# Patient Record
Sex: MALE | Race: WHITE | NOT HISPANIC OR LATINO | Employment: FULL TIME | URBAN - METROPOLITAN AREA
[De-identification: names, ages, dates, MRNs, and addresses within clinical notes are randomized per-mention and may not be internally consistent; named-entity substitution may affect disease eponyms.]

---

## 2017-01-04 ENCOUNTER — TELEPHONE (OUTPATIENT)
Dept: OTHER | Facility: OUTPATIENT CENTER | Age: 22
End: 2017-01-04

## 2017-01-04 DIAGNOSIS — F64.0 GENDER DYSPHORIA IN ADOLESCENT AND ADULT: Primary | ICD-10-CM

## 2017-01-04 NOTE — TELEPHONE ENCOUNTER
Requested Medication: Testosterone   Dose: 200mg/mL injection, Inject 0.3mLs into the muscle once a week  Quantity: 2mL  Refills: 3    Last seen at [facility]: 8/15/16  Next Appointment with Provider: 1/23/17

## 2017-01-05 ENCOUNTER — TELEPHONE (OUTPATIENT)
Dept: OTHER | Facility: OUTPATIENT CENTER | Age: 22
End: 2017-01-05

## 2017-01-05 RX ORDER — TESTOSTERONE CYPIONATE 200 MG/ML
60 INJECTION, SOLUTION INTRAMUSCULAR WEEKLY
Qty: 2 ML | Refills: 3 | Status: SHIPPED | OUTPATIENT
Start: 2017-01-05 | End: 2017-06-27

## 2017-01-20 ENCOUNTER — OFFICE VISIT (OUTPATIENT)
Dept: PEDIATRICS | Facility: CLINIC | Age: 22
End: 2017-01-20
Payer: COMMERCIAL

## 2017-01-20 VITALS
BODY MASS INDEX: 23.99 KG/M2 | DIASTOLIC BLOOD PRESSURE: 59 MMHG | WEIGHT: 162 LBS | TEMPERATURE: 98.2 F | HEART RATE: 89 BPM | OXYGEN SATURATION: 98 % | SYSTOLIC BLOOD PRESSURE: 114 MMHG | HEIGHT: 69 IN

## 2017-01-20 DIAGNOSIS — F43.21 ADJUSTMENT DISORDER WITH DEPRESSED MOOD: Primary | ICD-10-CM

## 2017-01-20 DIAGNOSIS — B34.9 VIRAL SYNDROME: ICD-10-CM

## 2017-01-20 DIAGNOSIS — R50.9 FEVER IN PEDIATRIC PATIENT: ICD-10-CM

## 2017-01-20 DIAGNOSIS — R07.0 THROAT PAIN: ICD-10-CM

## 2017-01-20 LAB
DEPRECATED S PYO AG THROAT QL EIA: NORMAL
MICRO REPORT STATUS: NORMAL
SPECIMEN SOURCE: NORMAL

## 2017-01-20 PROCEDURE — 99214 OFFICE O/P EST MOD 30 MIN: CPT | Performed by: PEDIATRICS

## 2017-01-20 PROCEDURE — 87880 STREP A ASSAY W/OPTIC: CPT | Performed by: PEDIATRICS

## 2017-01-20 PROCEDURE — 87081 CULTURE SCREEN ONLY: CPT | Performed by: PEDIATRICS

## 2017-01-20 RX ORDER — SERTRALINE HYDROCHLORIDE 100 MG/1
100 TABLET, FILM COATED ORAL DAILY
Qty: 30 TABLET | Refills: 5 | Status: SHIPPED | OUTPATIENT
Start: 2017-01-20 | End: 2017-08-07

## 2017-01-20 NOTE — NURSING NOTE
"Chief Complaint   Patient presents with     Fever     low grade fever, sore throat. HA 3-4 days.      Abdominal Pain     feel nauses. no diarhea and don't want to do anything 3 days        Initial /59 mmHg  Pulse 89  Temp(Src) 98.2  F (36.8  C) (Oral)  Ht 5' 9.1\" (1.755 m)  Wt 162 lb (73.483 kg)  BMI 23.86 kg/m2  SpO2 98% Estimated body mass index is 23.86 kg/(m^2) as calculated from the following:    Height as of this encounter: 5' 9.1\" (1.755 m).    Weight as of this encounter: 162 lb (73.483 kg).  BP completed using cuff size: SUDEEP Lombardi      "

## 2017-01-20 NOTE — PROGRESS NOTES
"SUBJECTIVE:                                                    Rohan Wilson is a 21 year old male who presents to clinic today because of:    Chief Complaint   Patient presents with     Fever     low grade fever, sore throat. HA 3-4 days.      Abdominal Pain     feel nauses. no diarhea and don't want to do anything 3 days       Rohan is due for a follow up of his depression.   See last visit. At that time we discussed the prolonged period of time in which he has not had any signs/symptoms of depression. Recall Rohan suffered a significant concussion as well as struggling with gender dysphoria at the time his antidepressants were started. He has recently completed transition and is successful in college and has acceptance of his family and friends.   We had discussed weaning off the zoloft. He decided to wait until later, perhaps the summer to give this a try. He has continued on his long time dose of 100 mg once a day without any significant side effects.   PHQ-9 SCORE 5/2/2016 7/8/2016 11/3/2016   Total Score - - -   Total Score 0 1 1       Of note he will soon be 22 and leaving my practice as well.        ROS:  Negative for constitutional, eye, ear, nose, throat, skin, respiratory, cardiac, and gastrointestinal other than those outlined in the HPI.    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 07/18/2016     Priority: Medium     Major depressive disorder, recurrent episode, in partial remission (H) 12/07/2015     Priority: Medium     Stria 05/06/2013      MEDICATIONS:  Current Outpatient Prescriptions   Medication Sig Dispense Refill     testosterone cypionate (DEPOTESTOTERONE CYPIONATE) 200 MG/ML injection Inject 0.3 mLs (60 mg) into the muscle once a week 2 mL 3     sertraline (ZOLOFT) 100 MG tablet Take 1 tablet (100 mg) by mouth daily 30 tablet 0     Syringe/Needle, Disp, 23G X 1\" 1 ML MISC To use with weekly IM injection 50 each 3     " "acetaminophen (TYLENOL) 325 MG tablet Take 2 tablets (650 mg) by mouth every 4 hours as needed for other (mild pain) 100 tablet 0     [DISCONTINUED] sertraline (ZOLOFT) 50 MG tablet Decrease dose by 25 mg every 2 weeks until on 25 mg a day then take every other day for 10 doses 60 tablet 0      ALLERGIES:  Allergies   Allergen Reactions     Amitriptyline-Perphenazine [Perphenazine-Amitriptyline] GI Disturbance     Pt developed an ulcer after taking med       Problem list and histories reviewed & adjusted, as indicated.    OBJECTIVE:                                                      /59 mmHg  Pulse 89  Temp(Src) 98.2  F (36.8  C) (Oral)  Ht 5' 9.1\" (1.755 m)  Wt 162 lb (73.483 kg)  BMI 23.86 kg/m2  SpO2 98%   Normalized stature-for-age data available only for age 0 to 20 years.    GENERAL: Well nourished, well developed tired appearing  SKIN: Clear. No significant rash, abnormal pigmentation or lesions  EYES:  No discharge or erythema. Normal pupils and EOM.  EARS: Normal canals. Tympanic membranes are normal; gray and translucent.  NOSE: boggy erythematous nasal mucosa with clear discharge.  MOUTH/THROAT: mild erythema posterior pharynxno tonsillar hypertrophy  NECK: Supple, no masses.  LYMPH NODES: No adenopathy  LUNGS: Clear. No rales, rhonchi, wheezing or retractions  HEART: Regular rhythm. Normal S1/S2. No murmurs.  ABDOMEN: Soft, non-tender, not distended, no masses or hepatosplenomegaly. Bowel sounds normal.     DIAGNOSTICS: Rapid strep Ag:  negative    ASSESSMENT/PLAN:                                                      1. Adjustment disorder with depressed mood    2. Throat pain    3. Viral syndrome    4. Fever in pediatric patient        FOLLOW UP:   Discussed the differential diagnosis of his/her signs/symptoms.  This is likely a viral illness. Common important and/or treatable causes have been ruled out. Symptomatic treatment with rest fluids, vaporizer, and appropriate doses of " analgesics.   If not improving or if worsening  Return if fever >104 or lasting longer than 5 days.  Discussed Rohan's concerns about his depression and use of medication for this. He is advised to continue eating well, getting exercise and good sleep. Continue supplements  For now continue Zoloft. Reviewed how to wean this and I do recommend this in the next 6 months.   His next visit for depression will be with internal medicine.        Brandi Benitez MD

## 2017-01-20 NOTE — MR AVS SNAPSHOT
"              After Visit Summary   1/20/2017    Rohan Wilson    MRN: [id number]           Patient Information     Date Of Birth          1995        Visit Information        Provider Department      1/20/2017 2:15 PM Brandi Benitez MD [facility]        Today's Diagnoses     Adjustment disorder with depressed mood    -  1    Throat pain        Viral syndrome        Fever in pediatric patient           Follow-ups after your visit        Who to contact     If you have questions or need follow up information about today's clinic visit or your schedule please contact [facility] directly at [phone].  Normal or non-critical lab and imaging results will be communicated to you by Popt[employer], letter or phone within 4 business days after the clinic has received the results. If you do not hear from us within 7 days, please contact the clinic through Poptenth[employer] or phone. If you have a critical or abnormal lab result, we will notify you by phone as soon as possible.  Submit refill requests through [email or web address] or call your pharmacy and they will forward the refill request to us. Please allow 3 business days for your refill to be completed.          Additional Information About Your Visit        MyChart Information     [email or web address] gives you secure access to your electronic health record. If you see a primary care provider, you can also send messages to your care team and make appointments. If you have questions, please call your primary care clinic.  If you do not have a primary care provider, please call [phone] and they will assist you.        Care EveryWhere ID     This is your Care EveryWhere ID. This could be used by other organizations to access your [facility] medical records  [id number]        Your Vitals Were     Pulse Temperature Height Pulse Oximetry BMI (Body Mass Index)       89 98.2  F (36.8  C) (Oral) 5' 9.1\" (1.755 m) 98% 23.85 kg/m2        Blood Pressure from Last " 3 Encounters:   01/23/17 123/59   01/20/17 114/59   11/03/16 107/64    Weight from Last 3 Encounters:   01/23/17 165 lb 9.6 oz (75.1 kg)   01/20/17 162 lb (73.5 kg)   11/03/16 169 lb 6.4 oz (76.8 kg)              We Performed the Following     Beta strep group A culture     Rapid strep screen          Today's Medication Changes          These changes are accurate as of: 1/20/17 11:59 PM.  If you have any questions, ask your nurse or doctor.               These medicines have changed or have updated prescriptions.        Dose/Directions    sertraline 100 MG tablet   Commonly known as:  ZOLOFT   This may have changed:  Another medication with the same name was removed. Continue taking this medication, and follow the directions you see here.   Used for:  Adjustment disorder with depressed mood   Changed by:  Brandi Benitez MD        Dose:  100 mg   Take 1 tablet (100 mg) by mouth daily   Quantity:  30 tablet   Refills:  5            Where to get your medicines      These medications were sent to [facility]/pharmacy #[id number] - [place], [place] - [address], [place] [address]     Phone:  [phone]     sertraline 100 MG tablet                Primary Care Provider Office Phone # Fax #    Brandi Benitez [phone]       [facility] [address] [place] [address]        Thank you!     Thank you for choosing [facility]  for your care. Our goal is always to provide you with excellent care. Hearing back from our patients is one way we can continue to improve our services. Please take a few minutes to complete the written survey that you may receive in the mail after your visit with us. Thank you!             Your Updated Medication List - Protect others around you: Learn how to safely use, store and throw away your medicines at www.disposemymeds.org.          This list is accurate as of: 1/20/17 11:59 PM.  Always use your  most recent med list.                   Brand Name Dispense Instructions for use    acetaminophen 325 MG tablet    TYLENOL    100 tablet    Take 2 tablets (650 mg) by mouth every 4 hours as needed for other (mild pain)       sertraline 100 MG tablet    ZOLOFT    30 tablet    Take 1 tablet (100 mg) by mouth daily       testosterone cypionate 200 MG/ML injection    DEPOTESTOTERONE CYPIONATE    2 mL    Inject 0.3 mLs (60 mg) into the muscle once a week

## 2017-01-23 ENCOUNTER — OFFICE VISIT (OUTPATIENT)
Dept: OTHER | Facility: OUTPATIENT CENTER | Age: 22
End: 2017-01-23

## 2017-01-23 VITALS
SYSTOLIC BLOOD PRESSURE: 123 MMHG | WEIGHT: 165.6 LBS | DIASTOLIC BLOOD PRESSURE: 59 MMHG | BODY MASS INDEX: 24.39 KG/M2 | HEART RATE: 63 BPM

## 2017-01-23 DIAGNOSIS — F64.0 GENDER DYSPHORIA IN ADOLESCENT AND ADULT: Primary | ICD-10-CM

## 2017-01-23 LAB
BACTERIA SPEC CULT: NORMAL
MICRO REPORT STATUS: NORMAL
SPECIMEN SOURCE: NORMAL

## 2017-01-23 ASSESSMENT — ENCOUNTER SYMPTOMS
CHILLS: 0
FEVER: 0
PALPITATIONS: 0
ABDOMINAL PAIN: 0
HEADACHES: 0
DOUBLE VISION: 0
POLYDIPSIA: 0
FOCAL WEAKNESS: 0
SENSORY CHANGE: 0
SHORTNESS OF BREATH: 0
DEPRESSION: 0
NAUSEA: 0
WEIGHT LOSS: 0
DIZZINESS: 0

## 2017-01-23 NOTE — PROGRESS NOTES
HPI  Pt. Here for f/u TG care    He continues on 60 IM Depo Testosterone weekly. No problems with medication or injections. No changes in body since last visit; no break through bleeding. No new health concerns since last visit  Activity: work out 6x/week--cardio and strength.         Review of Systems   Constitutional: Negative for fever, chills and weight loss.   Eyes: Negative for double vision.   Respiratory: Negative for shortness of breath.    Cardiovascular: Negative for chest pain, palpitations and leg swelling.   Gastrointestinal: Negative for nausea and abdominal pain.   Neurological: Negative for dizziness, sensory change, focal weakness and headaches.   Endo/Heme/Allergies: Negative for polydipsia.   Psychiatric/Behavioral: Negative for depression.         Physical Exam   Constitutional: He is well-developed, well-nourished, and in no distress.   Cardiovascular: Normal rate, regular rhythm and normal heart sounds.    Pulmonary/Chest: Effort normal and breath sounds normal.   Blood pressure 123/59, pulse 63, weight 75.116 kg (165 lb 9.6 oz).  Body mass index is 24.39 kg/(m^2).  Voice, skin per flowsheet    A/P  1. Gender dysphoria  Masculinizing well on current regimen. No changes in dose, continue activity.    F/u 4-6 months, will order labs at that visit.

## 2017-01-27 ENCOUNTER — TELEPHONE (OUTPATIENT)
Dept: OTHER | Facility: OUTPATIENT CENTER | Age: 22
End: 2017-01-27

## 2017-02-07 ENCOUNTER — TELEPHONE (OUTPATIENT)
Dept: OTHER | Facility: OUTPATIENT CENTER | Age: 22
End: 2017-02-07

## 2017-02-07 NOTE — TELEPHONE ENCOUNTER
Mother called to speak for Rohan saying that neither he nor she thought that regular appointments were necessary.  Returned call and let her know that we could certainly take Rohan out of the block schedule and if he ever felt the need to return, to just give a call.  Also called Rohan's cell phone to give him the same message.  Let both know that Dr. Gibson might need Rohan to see a mental health provider to check in periodically in order to continue the T and that I would send her a staff message letting her know to about Rohan appropriately discontinuing therapy at this time.

## 2017-06-27 DIAGNOSIS — F64.0 GENDER DYSPHORIA IN ADOLESCENT AND ADULT: ICD-10-CM

## 2017-06-27 RX ORDER — TESTOSTERONE CYPIONATE 200 MG/ML
60 INJECTION, SOLUTION INTRAMUSCULAR WEEKLY
Qty: 2 ML | Refills: 2 | Status: SHIPPED | OUTPATIENT
Start: 2017-06-27 | End: 2017-07-06

## 2017-06-29 ENCOUNTER — TELEPHONE (OUTPATIENT)
Dept: OTHER | Facility: OUTPATIENT CENTER | Age: 22
End: 2017-06-29

## 2017-07-03 ENCOUNTER — TELEPHONE (OUTPATIENT)
Dept: OTHER | Facility: OUTPATIENT CENTER | Age: 22
End: 2017-07-03

## 2017-07-06 ENCOUNTER — OFFICE VISIT (OUTPATIENT)
Dept: OTHER | Facility: OUTPATIENT CENTER | Age: 22
End: 2017-07-06

## 2017-07-06 VITALS
HEART RATE: 71 BPM | BODY MASS INDEX: 22.66 KG/M2 | SYSTOLIC BLOOD PRESSURE: 101 MMHG | DIASTOLIC BLOOD PRESSURE: 59 MMHG | WEIGHT: 153 LBS | HEIGHT: 69 IN

## 2017-07-06 DIAGNOSIS — F64.0 GENDER DYSPHORIA IN ADOLESCENT AND ADULT: ICD-10-CM

## 2017-07-06 DIAGNOSIS — Z78.9 FEMALE-TO-MALE TRANSGENDER PERSON: Primary | ICD-10-CM

## 2017-07-06 PROBLEM — Z90.13 S/P MASTECTOMY, BILATERAL: Status: ACTIVE | Noted: 2017-07-06

## 2017-07-06 RX ORDER — TESTOSTERONE CYPIONATE 200 MG/ML
60 INJECTION, SOLUTION INTRAMUSCULAR WEEKLY
Qty: 2 ML | Refills: 2 | Status: SHIPPED | OUTPATIENT
Start: 2017-07-06 | End: 2017-10-20

## 2017-07-06 ASSESSMENT — ENCOUNTER SYMPTOMS
VOMITING: 0
UNEXPECTED WEIGHT CHANGE: 0
NERVOUS/ANXIOUS: 0
FEVER: 0
ABDOMINAL PAIN: 0
PALPITATIONS: 0
FREQUENCY: 0
POLYDIPSIA: 0
DYSPHORIC MOOD: 0
NUMBNESS: 0
SHORTNESS OF BREATH: 0
HEADACHES: 0
CHEST TIGHTNESS: 0
LIGHT-HEADEDNESS: 0
WEAKNESS: 0
CHILLS: 0

## 2017-07-06 NOTE — MR AVS SNAPSHOT
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      Who to contact     Please call your clinic at [phone] to:    Ask questions about your health    Make or cancel appointments    Discuss your medicines    Learn about your test results    Speak to your doctor   If you have compliments or concerns about an experience at your clinic, or if you wish to file a complaint, please contact [facility] Patient Relations at [phone] or email us at [email or web address]         Additional Information About Your Visit        MyChart Information     [employer] gives you secure access to your electronic health record. If you see a primary care provider, you can also send messages to your care team and make appointments. If you have questions, please call your primary care clinic.  If you do not have a primary care provider, please call [phone] and they will assist you.      [employer] is an electronic gateway that provides easy, online access to your medical records. With [employer], you can request a clinic appointment, read your test results, renew a prescription or communicate with your care team.     To access your existing account, please contact your [facility] or call [phone] for assistance.        Care EveryWhere ID     This is your Care EveryWhere ID. This could be used by other organizations to access your [facility] medical records  [id number]        Your Vitals Were     Pulse Height  "BMI (Body Mass Index)             71 1.753 m (5' 9\") 22.59 kg/m2          Blood Pressure from Last 3 Encounters:   07/06/17 101/59   01/23/17 123/59   01/20/17 114/59    Weight from Last 3 Encounters:   07/06/17 69.4 kg (153 lb)   01/23/17 75.1 kg (165 lb 9.6 oz)   01/20/17 73.5 kg (162 lb)                 Where to get your medicines      These medications were sent to [facility] [place], [place] - [address]  [address] , [facility] [address]     Phone:  [phone]     Syringe/Needle (Disp) 23G X 1\" 1 ML Misc         Some of these will need a paper prescription and others can be bought over the counter.  Ask your nurse if you have questions.     Bring a paper prescription for each of these medications     testosterone cypionate 200 MG/ML injection          Primary Care Provider    None Specified       No primary provider on file.        Equal Access to Services     INGRID HERRERA : Preet guerreroo Sotimothy, waaxda luqadaha, qaybta kaalmada adeenoch, mary bennett . [facility] [phone].    ATENCIÓN: Si habla español, tiene a dodge disposición servicios gratuitos de asistencia lingüística. Llame al [phone].    We comply with applicable federal civil rights laws and [place] laws. We do not discriminate on the basis of race, color, national origin, age, disability sex, sexual orientation or gender identity.            Thank you!     Thank you for choosing [facility]  for your care. Our goal is always to provide you with excellent care. Hearing back from our patients is one way we can continue to improve our services. Please take a few minutes to complete the written survey that you may receive in the mail after your visit with us. Thank you!             Your Updated Medication List - Protect others around you: Learn how to safely use, store and throw away your medicines at www.disposemymeds.org.          This list is accurate as of: " "7/6/17 11:59 PM.  Always use your most recent med list.                   Brand Name Dispense Instructions for use Diagnosis    acetaminophen 325 MG tablet    TYLENOL    100 tablet    Take 2 tablets (650 mg) by mouth every 4 hours as needed for other (mild pain)    Gender identity disorder in adolescents or adults       sertraline 100 MG tablet    ZOLOFT    30 tablet    Take 1 tablet (100 mg) by mouth daily    Adjustment disorder with depressed mood       Syringe/Needle (Disp) 23G X 1\" 1 ML Misc     50 each    To use with weekly IM injection    Gender dysphoria in adolescent and adult, Female-to-male transgender person       testosterone cypionate 200 MG/ML injection    DEPOTESTOTERONE    2 mL    Inject 0.3 mLs (60 mg) into the muscle once a week    Gender dysphoria in adolescent and adult, Female-to-male transgender person         "

## 2017-07-06 NOTE — NURSING NOTE
"Chief Complaint   Patient presents with     RECHECK     TG - Medication renewal       Vitals:    07/06/17 1328   BP: 101/59   Pulse: 71   Weight: 69.4 kg (153 lb)   Height: 1.753 m (5' 9\")       Body mass index is 22.59 kg/(m^2).      Marlen Rodriguez CMA      No Current Primary Provider                        "

## 2017-07-06 NOTE — PROGRESS NOTES
"       MAGDALENE Madrigal is a 22 year old individual that uses pronouns He/Him/His/Himself that presents today for follow up of:  masculinizing hormone therapy.   Gender identity: male.   Started Hormone  therapy  11/2015  Continues on Depo Testosterone 60 mg IM weekly    Any special concerns today?  no current concerns  On hormones?  YES +++ Shot day of the week? Thursday      Due for labs?  Yes      +++ Refills of meds needed?  Yes  Gender related body changes since last visit: None, no breakthrough bleeding    Activity: work out 6x/day cardio and strength training. 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  New health concerns since last visit: none  ---    Past Surgical History:   Procedure Laterality Date     ESOPHAGOSCOPY, GASTROSCOPY, DUODENOSCOPY (EGD), COMBINED  2/9/2014    Procedure: COMBINED ESOPHAGOSCOPY, GASTROSCOPY, DUODENOSCOPY (EGD), BIOPSY SINGLE OR MULTIPLE;  Esophagoscopy,Gastroscopy,Duodenoscopy- room 240 with stomach biopsies via cold forceps.;  Surgeon: Carmelo Tidwell MD;  Location:  GI     TRANSGENDER MASTECTOMY Bilateral 7/25/2016    Procedure: TRANSGENDER MASTECTOMY;  Surgeon: Rosario Resendiz MD;  Location: [facility] OR       Patient Active Problem List   Diagnosis     Stria     Major depressive disorder, recurrent episode, in partial remission (H)     Female-to-male transgender person       Current Outpatient Prescriptions   Medication Sig Dispense Refill     testosterone cypionate (DEPOTESTOTERONE) 200 MG/ML injection Inject 0.3 mLs (60 mg) into the muscle once a week 2 mL 2     Syringe/Needle, Disp, 23G X 1\" 1 ML MISC To use with weekly IM injection 50 each 3     sertraline (ZOLOFT) 100 MG tablet Take 1 tablet (100 mg) by mouth daily 30 tablet 5     acetaminophen (TYLENOL) 325 MG tablet Take 2 tablets (650 mg) by mouth every 4 hours as needed for other (mild pain) 100 tablet 0       History   Smoking Status     Never Smoker   Smokeless Tobacco     Never Used     Comment: no smokers in the " "home          Allergies   Allergen Reactions     Amitriptyline-Perphenazine [Perphenazine-Amitriptyline] GI Disturbance     Pt developed an ulcer after taking med       Health Maintenance Due   Topic Date Due     MIGRAINE ACTION PLAN  02/22/2014     DEPRESSION ACTION PLAN Q1 YR  03/03/2016     Mental Health Tx Plan Q11 MOS  11/01/2016     PHQ-9 Q3 MONTHS  02/03/2017     TETANUS IMMUNIZATION (SYSTEM ASSIGNED)  08/02/2017         Problem, Medication and Allergy Lists were reviewed and are current..         Review of Systems:   Review of Systems   Constitutional: Negative for chills, fever and unexpected weight change.   Eyes: Negative for visual disturbance.   Respiratory: Negative for chest tightness and shortness of breath.    Cardiovascular: Negative for chest pain, palpitations and leg swelling.   Gastrointestinal: Negative for abdominal pain and vomiting.   Endocrine: Negative for polydipsia and polyuria.   Genitourinary: Negative for frequency.   Neurological: Negative for weakness, light-headedness, numbness and headaches.   Psychiatric/Behavioral: Negative for dysphoric mood and suicidal ideas. The patient is not nervous/anxious.             Physical Exam:     Vitals:    07/06/17 1328   BP: 101/59   Pulse: 71   Weight: 69.4 kg (153 lb)   Height: 1.753 m (5' 9\")     BMI= Body mass index is 22.59 kg/(m^2).   Wt Readings from Last 10 Encounters:   07/06/17 69.4 kg (153 lb)   01/23/17 75.1 kg (165 lb 9.6 oz)   01/20/17 73.5 kg (162 lb)   11/03/16 76.8 kg (169 lb 6.4 oz)   08/15/16 72.6 kg (160 lb)   08/02/16 73.8 kg (162 lb 9.6 oz)   07/25/16 73.4 kg (161 lb 13.1 oz)   07/14/16 73.5 kg (162 lb)   07/07/16 73.5 kg (162 lb)   05/02/16 75.8 kg (167 lb)     Appearance: Male appearance and dress    GENERAL:: healthy, alert and no distress  RESP: lungs clear to auscultation - no rales, no rhonchi, no wheezes  CV: regular rates and rhythm, normal S1 S2, no S3 or S4 and no murmur, no click or rub -  Voice, skin/ per " flowsheet    Affect: Appropriate/mood-congruent           Labs:   Results from last visit:  Office Visit on 01/20/2017   Component Date Value Ref Range Status     Specimen Description 01/20/2017 Throat   Final     Rapid Strep A Screen 01/20/2017    Final                    Value:NEGATIVE: No Group A streptococcal antigen detected by immunoassay, await   culture report.       Micro Report Status 01/20/2017 FINAL 01/20/2017   Final     Specimen Description 01/20/2017 Throat   Final     Culture Micro 01/20/2017 No Beta Streptococcus isolated   Final     Micro Report Status 01/20/2017 FINAL 01/23/2017   Final       Assessment and Plan   1. Gender dysphoria  Masculinizing well on current dose testosterone  Check Hgb, lipid panel plus, testosterone level.     Not currently sexually active  Contraception:   not needed  Health Maintenance:  Pap Smear Report(most recent only)       When: reminided about guidelines for Pap testing    HPVQuadrivalent 3/31/2011, 10/19/2009, 8/2/2007       Counselled patient about controlled substances: Yes. Details:       Follow up:  Follow up in 4-6 months  Results by mychart  Questions were elicited and answered.     Stalin Gibson MD

## 2017-07-22 ENCOUNTER — HEALTH MAINTENANCE LETTER (OUTPATIENT)
Age: 22
End: 2017-07-22

## 2017-07-29 DIAGNOSIS — F43.21 ADJUSTMENT DISORDER WITH DEPRESSED MOOD: ICD-10-CM

## 2017-07-31 NOTE — TELEPHONE ENCOUNTER
Zoloft 100mg       Last Written Prescription Date: 1/20/17  Last Fill Quantity: 30,  # refills: 5   Last Office Visit with G, P or [facility] prescribing provider: 1/20/17    Routing refill request to provider for review/approval because:  Pediatric protocol     RENITA White

## 2017-08-01 NOTE — TELEPHONE ENCOUNTER
Writer called to clarify on medication. Patient's mom stated (CTC in epic) that patient is at work and she will have to ask him if he needs this refill or if this was an error. Patient's mother also updated writer that patient will be transitioning to Dr. Sandoval in .  Pending call back from patient for update on medication  Routed to current PCP as a GRETCHEN White RN

## 2017-08-01 NOTE — TELEPHONE ENCOUNTER
Please call;  This may be a refill error. If he needs more medication please authorize fill for one more month. Rohan should be seeing internal medicine now.   The plan had been to stop this medication.

## 2017-08-02 ENCOUNTER — TELEPHONE (OUTPATIENT)
Dept: PEDIATRICS | Facility: CLINIC | Age: 22
End: 2017-08-02

## 2017-08-02 NOTE — TELEPHONE ENCOUNTER
Writer received phone call from pharmacy asking if script was approved for refill by PCP. Writer updated that PCP needed some information from patient prior to approval. Pending a call back from patient, call was placed yesterday 8/1/17, please see below message.

## 2017-08-07 DIAGNOSIS — F43.21 ADJUSTMENT DISORDER WITH DEPRESSED MOOD: ICD-10-CM

## 2017-08-07 RX ORDER — SERTRALINE HYDROCHLORIDE 100 MG/1
TABLET, FILM COATED ORAL
Qty: 30 TABLET | Refills: 0 | OUTPATIENT
Start: 2017-08-07

## 2017-08-07 RX ORDER — SERTRALINE HYDROCHLORIDE 100 MG/1
100 TABLET, FILM COATED ORAL DAILY
Qty: 30 TABLET | Refills: 0 | Status: SHIPPED | OUTPATIENT
Start: 2017-08-07 | End: 2017-09-08

## 2017-08-07 NOTE — TELEPHONE ENCOUNTER
Mother calls back and says that Rohan decided to keep taking Zoloft because he is going back to school soon.  She says he will have a new roommate and just feels he'll do better adjusting to everything if he stays on Zoloft.    She says she will get him in to see Dr Sandoval before he needs another refill.

## 2017-08-12 ENCOUNTER — HEALTH MAINTENANCE LETTER (OUTPATIENT)
Age: 22
End: 2017-08-12

## 2017-09-07 DIAGNOSIS — F43.21 ADJUSTMENT DISORDER WITH DEPRESSED MOOD: ICD-10-CM

## 2017-09-07 RX ORDER — SERTRALINE HYDROCHLORIDE 100 MG/1
100 TABLET, FILM COATED ORAL DAILY
Qty: 30 TABLET | Refills: 0 | Status: CANCELLED | OUTPATIENT
Start: 2017-09-07

## 2017-09-07 NOTE — TELEPHONE ENCOUNTER
This is likely a pharmacy generated refill request. Rohan has an appointment with Dr. Sandoval tomorrow for follow up antidepressant.   Please make sure he knows he has this appointment.

## 2017-09-07 NOTE — TELEPHONE ENCOUNTER
Sertraline      Last Written Prescription Date:  8/7/17  Last Fill Quantity: 30,   # refills: 0  Last Office Visit with G, P or [facility] prescribing provider: 1/20/17  Future Office visit:    Next 5 appointments (look out 90 days)     Sep 08, 2017  2:40 PM CDT   SHORT with Rubia Sandoval MD   [facility]    [address]  [facility] [address]   [phone]                   Routing refill request to provider for review/approval because:  Pediatric protocol  Francine Hawley RN

## 2017-09-08 ENCOUNTER — OFFICE VISIT (OUTPATIENT)
Dept: INTERNAL MEDICINE | Facility: CLINIC | Age: 22
End: 2017-09-08
Payer: COMMERCIAL

## 2017-09-08 VITALS
OXYGEN SATURATION: 98 % | HEART RATE: 92 BPM | TEMPERATURE: 98.2 F | WEIGHT: 153 LBS | HEIGHT: 69 IN | DIASTOLIC BLOOD PRESSURE: 60 MMHG | SYSTOLIC BLOOD PRESSURE: 102 MMHG | BODY MASS INDEX: 22.66 KG/M2

## 2017-09-08 DIAGNOSIS — F32.5 MAJOR DEPRESSIVE DISORDER WITH SINGLE EPISODE, IN FULL REMISSION (H): Primary | ICD-10-CM

## 2017-09-08 PROCEDURE — 99213 OFFICE O/P EST LOW 20 MIN: CPT | Performed by: INTERNAL MEDICINE

## 2017-09-08 RX ORDER — SERTRALINE HYDROCHLORIDE 100 MG/1
100 TABLET, FILM COATED ORAL DAILY
Qty: 90 TABLET | Refills: 3 | Status: SHIPPED | OUTPATIENT
Start: 2017-09-08 | End: 2018-08-27

## 2017-09-08 NOTE — MR AVS SNAPSHOT
"              After Visit Summary   9/8/2017    Rohan Wilson    MRN: [id number]           Patient Information     Date Of Birth          1995        Visit Information        Provider Department      9/8/2017 2:40 PM Rubia Sandoval MD [facility]        Today's Diagnoses     Major depressive disorder with single episode, in full remission (H)    -  1       Follow-ups after your visit        Who to contact     If you have questions or need follow up information about today's clinic visit or your schedule please contact [facility] directly at [phone].  Normal or non-critical lab and imaging results will be communicated to you by Meridian-IQ[employer], letter or phone within 4 business days after the clinic has received the results. If you do not hear from us within 7 days, please contact the clinic through [employer] or phone. If you have a critical or abnormal lab result, we will notify you by phone as soon as possible.  Submit refill requests through [email or web address] or call your pharmacy and they will forward the refill request to us. Please allow 3 business days for your refill to be completed.          Additional Information About Your Visit        MyChart Information     Spring.me gives you secure access to your electronic health record. If you see a primary care provider, you can also send messages to your care team and make appointments. If you have questions, please call your primary care clinic.  If you do not have a primary care provider, please call [phone] and they will assist you.        Care EveryWhere ID     This is your Care EveryWhere ID. This could be used by other organizations to access your [place] medical records  [id number]        Your Vitals Were     Pulse Temperature Height Pulse Oximetry BMI (Body Mass Index)       92 98.2  F (36.8  C) (Oral) 5' 9\" (1.753 m) 98% 22.59 kg/m2        Blood Pressure from Last 3 Encounters:   09/08/17 102/60   01/20/17 114/59   11/03/16 " 107/64    Weight from Last 3 Encounters:   09/08/17 153 lb (69.4 kg)   01/20/17 162 lb (73.5 kg)   11/03/16 169 lb 6.4 oz (76.8 kg)              Today, you had the following     No orders found for display         Where to get your medicines      These medications were sent to [facility]/pharmacy #[id number] [place], [place] - [address]  [address], [place] [address]     Phone:  [phone]     sertraline 100 MG tablet          Primary Care Provider    None Specified       No primary provider on file.        Equal Access to Services     [facility]: Hadii hortensia Carlos, wabrigida correia, yessy kaalmagentry chung, mary bennett . So [facility] [phone].    ATENCIÓN: Si habla español, tiene a dodge disposición servicios gratuitos de asistencia lingüística. [facility] [phone].    We comply with applicable federal civil rights laws and [place] laws. We do not discriminate on the basis of race, color, national origin, age, disability sex, sexual orientation or gender identity.            Thank you!     Thank you for choosing [facility]  for your care. Our goal is always to provide you with excellent care. Hearing back from our patients is one way we can continue to improve our services. Please take a few minutes to complete the written survey that you may receive in the mail after your visit with us. Thank you!             Your Updated Medication List - Protect others around you: Learn how to safely use, store and throw away your medicines at www.disposemymeds.org.          This list is accurate as of: 9/8/17 11:59 PM.  Always use your most recent med list.                   Brand Name Dispense Instructions for use Diagnosis    acetaminophen 325 MG tablet    TYLENOL    100 tablet    Take 2 tablets (650 mg) by mouth every 4 hours as needed for other (mild pain)    Gender identity disorder in adolescents or adults       sertraline 100 MG tablet    ZOLOFT  "   90 tablet    Take 1 tablet (100 mg) by mouth daily    Major depressive disorder with single episode, in full remission (H)       Syringe/Needle (Disp) 23G X 1\" 1 ML Misc     50 each    To use with weekly IM injection    Gender dysphoria in adolescent and adult, Female-to-male transgender person       testosterone cypionate 200 MG/ML injection    DEPOTESTOTERONE    2 mL    Inject 0.3 mLs (60 mg) into the muscle once a week    Gender dysphoria in adolescent and adult, Female-to-male transgender person         "

## 2017-09-08 NOTE — NURSING NOTE
"Chief Complaint   Patient presents with     Recheck Medication       Initial /60  Pulse 92  Temp 98.2  F (36.8  C) (Oral)  Ht 5' 9\" (1.753 m)  Wt 153 lb (69.4 kg)  SpO2 98%  BMI 22.59 kg/m2 Estimated body mass index is 22.59 kg/(m^2) as calculated from the following:    Height as of this encounter: 5' 9\" (1.753 m).    Weight as of this encounter: 153 lb (69.4 kg).  Medication Reconciliation: complete    "

## 2017-09-08 NOTE — PROGRESS NOTES
"  SUBJECTIVE:   Rohan Wilson is a 22 year old male who presents to clinic today for the following health issues:    He is transferring care to adult medicine from pediatrics. He has no acute concerns today, just needs medication refill. He has started a new semester of college with full credits. Because of concussion, he was not taking a full load so this is a little stressful. He may consider going off med next year.   Depression Followup    Status since last visit: Stable, well controlled    See PHQ-9 for current symptoms.  Other associated symptoms: None    Complicating factors:   Significant life event:  Yes-  See above   Current substance abuse:  None  Anxiety or Panic symptoms:  No    PHQ-9  English  PHQ-9   Any Language    Patient Active Problem List   Diagnosis     Stria     Major depressive disorder, recurrent episode, in partial remission (H)     Female-to-male transgender person     S/P mastectomy, bilateral     Current Outpatient Prescriptions   Medication Sig Dispense Refill     sertraline (ZOLOFT) 100 MG tablet Take 1 tablet (100 mg) by mouth daily 90 tablet 3     testosterone cypionate (DEPOTESTOTERONE) 200 MG/ML injection Inject 0.3 mLs (60 mg) into the muscle once a week 2 mL 2     Syringe/Needle, Disp, 23G X 1\" 1 ML MISC To use with weekly IM injection 50 each 3     acetaminophen (TYLENOL) 325 MG tablet Take 2 tablets (650 mg) by mouth every 4 hours as needed for other (mild pain) 100 tablet 0      Social History   Substance Use Topics     Smoking status: Never Smoker     Smokeless tobacco: Never Used      Comment: no smokers in the home     Alcohol use No        Reviewed and updated as needed this visit by clinical staffTobacco  Med Hx  Surg Hx  Fam Hx  Soc Hx      Reviewed and updated as needed this visit by Provider         ROS:  negative    OBJECTIVE:     /60  Pulse 92  Temp 98.2  F (36.8  C) (Oral)  Ht 5' 9\" (1.753 m)  Wt 153 lb (69.4 kg)  SpO2 98%  BMI 22.59 kg/m2  Body " mass index is 22.59 kg/(m^2).    PHQ-9 SCORE 7/8/2016 11/3/2016 9/10/2017   Total Score - - -   Total Score 1 1 0   Some encounter information is confidential and restricted. Go to Review Flowsheets activity to see all data.          ASSESSMENT/PLAN:             1. Major depressive disorder with single episode, in full remission (H)  Symptoms well controlled, continue med, call in spring if wants to try weaning off. Otherwise follow up one year.   - sertraline (ZOLOFT) 100 MG tablet; Take 1 tablet (100 mg) by mouth daily  Dispense: 90 tablet; Refill: 3        Rubia Sandoval MD  [facility]

## 2017-09-10 ASSESSMENT — PATIENT HEALTH QUESTIONNAIRE - PHQ9: SUM OF ALL RESPONSES TO PHQ QUESTIONS 1-9: 0

## 2017-10-20 DIAGNOSIS — F64.0 GENDER DYSPHORIA IN ADOLESCENT AND ADULT: ICD-10-CM

## 2017-10-20 DIAGNOSIS — Z78.9 FEMALE-TO-MALE TRANSGENDER PERSON: ICD-10-CM

## 2017-10-20 RX ORDER — TESTOSTERONE CYPIONATE 200 MG/ML
60 INJECTION, SOLUTION INTRAMUSCULAR WEEKLY
Qty: 2 ML | Refills: 0 | Status: SHIPPED | OUTPATIENT
Start: 2017-10-20 | End: 2017-12-07

## 2017-10-20 NOTE — TELEPHONE ENCOUNTER
Patient was last seen on 7/6/17. Lab work is still out. Patient was called today (10/20) and reminded to complete lab work and to make a future appointment in November or December.

## 2017-10-23 ENCOUNTER — CARE COORDINATION (OUTPATIENT)
Dept: OTHER | Facility: OUTPATIENT CENTER | Age: 22
End: 2017-10-23

## 2017-11-29 DIAGNOSIS — F64.0 GENDER DYSPHORIA IN ADOLESCENT AND ADULT: ICD-10-CM

## 2017-11-29 DIAGNOSIS — Z78.9 FEMALE-TO-MALE TRANSGENDER PERSON: ICD-10-CM

## 2017-11-29 LAB
ALT SERPL-CCNC: 9 U/L (ref 0–70)
AST SERPL-CCNC: 34 U/L (ref 0–55)
CHOLEST SERPL-MCNC: 158 MG/DL (ref 0–200)
CHOLEST/HDLC SERPL: 2.8 {RATIO} (ref 0–5)
FASTING SPECIMEN: YES
GLUCOSE SERPL-MCNC: 91 MG/DL (ref 60–109)
HDLC SERPL-MCNC: 57 MG/DL
HEMOGLOBIN: 15.8 G/DL (ref 13.3–17.7)
LDLC SERPL CALC-MCNC: 83 MG/DL (ref 0–129)
TRIGL SERPL-MCNC: 91 MG/DL (ref 0–150)
VLDL-CHOLESTEROL: 18 (ref 7–32)

## 2017-12-01 LAB
SHBG SERPL-SCNC: 32 NMOL/L (ref 11–80)
TESTOST FREE SERPL-MCNC: 16.97 NG/DL (ref 4.7–24.4)
TESTOST SERPL-MCNC: 740 NG/DL (ref 240–950)

## 2017-12-07 ENCOUNTER — OFFICE VISIT (OUTPATIENT)
Dept: OTHER | Facility: OUTPATIENT CENTER | Age: 22
End: 2017-12-07

## 2017-12-07 VITALS
SYSTOLIC BLOOD PRESSURE: 101 MMHG | WEIGHT: 156 LBS | HEIGHT: 69 IN | BODY MASS INDEX: 23.11 KG/M2 | HEART RATE: 61 BPM | DIASTOLIC BLOOD PRESSURE: 60 MMHG

## 2017-12-07 DIAGNOSIS — Z78.9 FEMALE-TO-MALE TRANSGENDER PERSON: ICD-10-CM

## 2017-12-07 DIAGNOSIS — F64.0 GENDER DYSPHORIA IN ADOLESCENT AND ADULT: ICD-10-CM

## 2017-12-07 RX ORDER — TESTOSTERONE CYPIONATE 200 MG/ML
60 INJECTION, SOLUTION INTRAMUSCULAR WEEKLY
Qty: 2 ML | Refills: 3 | Status: SHIPPED | OUTPATIENT
Start: 2017-12-07 | End: 2017-12-18

## 2017-12-07 ASSESSMENT — ENCOUNTER SYMPTOMS
DYSPHORIC MOOD: 0
SHORTNESS OF BREATH: 0
FEVER: 0
FREQUENCY: 0
CHEST TIGHTNESS: 0
HEADACHES: 0
WEAKNESS: 0
PALPITATIONS: 0
NAUSEA: 0
ABDOMINAL PAIN: 0
POLYDIPSIA: 0
NUMBNESS: 0
NERVOUS/ANXIOUS: 0
UNEXPECTED WEIGHT CHANGE: 0
CHILLS: 0
LIGHT-HEADEDNESS: 0
VOMITING: 0

## 2017-12-07 NOTE — NURSING NOTE
"Chief Complaint   Patient presents with     RECHECK     TG       Vitals:    12/07/17 0849   BP: 101/60   Pulse: 61   Weight: 70.8 kg (156 lb)   Height: 1.753 m (5' 9\")       Body mass index is 23.04 kg/(m^2).      Marlen Rodriguez CMA    "

## 2017-12-07 NOTE — MR AVS SNAPSHOT
After Visit Summary   12/7/2017    Rohan Wilson    MRN: [id number]           Patient Information     Date Of Birth          1995        Visit Information        Provider Department      12/7/2017 8:40 AM Stalin Gibson MD [facility]        Today's Diagnoses     Gender dysphoria in adolescent and adult        Female-to-male transgender person           Follow-ups after your visit        Follow-up notes from your care team     Return in about 6 months (around 6/7/2018).      Who to contact     Please call your clinic at [phone] to:    Ask questions about your health    Make or cancel appointments    Discuss your medicines    Learn about your test results    Speak to your doctor   If you have compliments or concerns about an experience at your clinic, or if you wish to file a complaint, please contact [facility] Patient Relations at [phone] or email us at [email or web address]         Additional Information About Your Visit        MyChart Information     [employer] gives you secure access to your electronic health record. If you see a primary care provider, you can also send messages to your care team and make appointments. If you have questions, please call your primary care clinic.  If you do not have a primary care provider, please call [phone] and they will assist you.      Favery is an electronic gateway that provides easy, online access to your medical records. With [employer], you can request a clinic appointment, read your test results, renew a prescription or communicate with your care team.     To access your existing account, please contact your [facility] or call [phone] for assistance.        Care EveryWhere ID     This is your Care EveryWhere ID. This could be used by other organizations to access your [place] medical records  [id number]        Your Vitals Were     Pulse Height  "BMI (Body Mass Index)             61 1.753 m (5' 9\") 23.04 kg/m2          Blood Pressure from Last 3 Encounters:   12/07/17 101/60   09/08/17 102/60   07/06/17 101/59    Weight from Last 3 Encounters:   12/07/17 70.8 kg (156 lb)   09/08/17 69.4 kg (153 lb)   07/06/17 69.4 kg (153 lb)              Today, you had the following     No orders found for display         Where to get your medicines      These medications were sent to [facility] - [place], [place] - [address]  [address] , [facility] [address]     Phone:  [phone]     Syringe/Needle (Disp) 23G X 1\" 1 ML Misc         Some of these will need a paper prescription and others can be bought over the counter.  Ask your nurse if you have questions.     Bring a paper prescription for each of these medications     testosterone cypionate 200 MG/ML injection          Primary Care Provider Office Phone # Fax #    Rubia Sandoval [phone]       [address]   [facility] [address]        Equal Access to Services     [facility]: Hadii hortensia alvarenga Sotimothy, waaxda luqadaha, qaybta kaalmada ademilyyada, mary bennett . So [facility] [place] [phone].    ATENCIÓN: Si habla español, tiene a dodge disposición servicios gratuitos de asistencia lingüística. Llame al [phone].    We comply with applicable federal civil rights laws and [place] laws. We do not discriminate on the basis of race, color, national origin, age, disability, sex, sexual orientation, or gender identity.            Thank you!     Thank you for choosing [facility]  for your care. Our goal is always to provide you with excellent care. Hearing back from our patients is one way we can continue to improve our services. Please take a few minutes to complete the written survey that you may receive in the mail after your visit with us. Thank you!             Your Updated Medication List - Protect others around you: " "Learn how to safely use, store and throw away your medicines at www.disposemymeds.org.          This list is accurate as of: 12/7/17 11:59 PM.  Always use your most recent med list.                   Brand Name Dispense Instructions for use Diagnosis    acetaminophen 325 MG tablet    TYLENOL    100 tablet    Take 2 tablets (650 mg) by mouth every 4 hours as needed for other (mild pain)    Gender identity disorder in adolescents or adults       sertraline 100 MG tablet    ZOLOFT    90 tablet    Take 1 tablet (100 mg) by mouth daily    Major depressive disorder with single episode, in full remission (H)       Syringe/Needle (Disp) 23G X 1\" 1 ML Misc     50 each    To use with weekly IM injection    Gender dysphoria in adolescent and adult, Female-to-male transgender person       testosterone cypionate 200 MG/ML injection    DEPOTESTOTERONE    2 mL    Inject 0.3 mLs (60 mg) into the muscle once a week    Gender dysphoria in adolescent and adult, Female-to-male transgender person         "

## 2017-12-07 NOTE — PROGRESS NOTES
"       MAGDALENE Madrigal is a 22 year old individual that uses pronouns He/Him/His/Himself that presents today for follow up of:  masculinizing hormone therapy.   Gender identity: male.   Started Hormone  therapy  11/2015  Continues on Depo Testosterone 60 mg IM weekly  .  Any special concerns today?  No concerns; no breakthrough bleeding  On hormones?  YES +++ Shot day of the week? Sunday      Due for labs?  Yes      +++ Refills of meds needed?  Yes  Gender related body changes since last visit: None    Activity: work out 6x/day cardio and strength training  New health concerns since last visit: None  ---    Past Surgical History:   Procedure Laterality Date     ESOPHAGOSCOPY, GASTROSCOPY, DUODENOSCOPY (EGD), COMBINED  2/9/2014    Procedure: COMBINED ESOPHAGOSCOPY, GASTROSCOPY, DUODENOSCOPY (EGD), BIOPSY SINGLE OR MULTIPLE;  Esophagoscopy,Gastroscopy,Duodenoscopy- room 240 with stomach biopsies via cold forceps.;  Surgeon: Carmelo Tidwell MD;  Location:  GI     TRANSGENDER MASTECTOMY Bilateral 7/25/2016    Procedure: TRANSGENDER MASTECTOMY;  Surgeon: Rosario Resendiz MD;  Location: [facility] OR       Patient Active Problem List   Diagnosis     Stria     Major depressive disorder, recurrent episode, in partial remission (H)     Female-to-male transgender person     S/P mastectomy, bilateral       Current Outpatient Prescriptions   Medication Sig Dispense Refill     testosterone cypionate (DEPOTESTOTERONE) 200 MG/ML injection Inject 0.3 mLs (60 mg) into the muscle once a week 2 mL 0     sertraline (ZOLOFT) 100 MG tablet Take 1 tablet (100 mg) by mouth daily 90 tablet 3     Syringe/Needle, Disp, 23G X 1\" 1 ML MISC To use with weekly IM injection 50 each 3     acetaminophen (TYLENOL) 325 MG tablet Take 2 tablets (650 mg) by mouth every 4 hours as needed for other (mild pain) 100 tablet 0       History   Smoking Status     Never Smoker   Smokeless Tobacco     Never Used     Comment: no smokers in the home        " "  Allergies   Allergen Reactions     Amitriptyline-Perphenazine [Perphenazine-Amitriptyline] GI Disturbance     Pt developed an ulcer after taking med       Health Maintenance Due   Topic Date Due     MIGRAINE ACTION PLAN  02/22/2014     DEPRESSION ACTION PLAN Q1 YR  03/03/2016     Mental Health Tx Plan Q11 MOS  11/01/2016     TETANUS IMMUNIZATION (SYSTEM ASSIGNED)  08/02/2017     PEDS DTAP/TDAP (7 - Td) 08/02/2017     INFLUENZA VACCINE (SYSTEM ASSIGNED)  09/01/2017     PHQ-9 Q3 MONTHS  12/08/2017         Problem, Medication and Allergy Lists were reviewed and are current..         Review of Systems:   Review of Systems   Constitutional: Negative for chills, fever and unexpected weight change.   Eyes: Negative for visual disturbance.   Respiratory: Negative for chest tightness and shortness of breath.    Cardiovascular: Negative for chest pain, palpitations and leg swelling.   Gastrointestinal: Negative for abdominal pain, nausea and vomiting.   Endocrine: Negative for polydipsia and polyuria.   Genitourinary: Negative for frequency.   Neurological: Negative for weakness, light-headedness, numbness and headaches.   Psychiatric/Behavioral: Negative for dysphoric mood and suicidal ideas. The patient is not nervous/anxious.             Physical Exam:     Vitals:    12/07/17 0849   BP: 101/60   Pulse: 61   Weight: 70.8 kg (156 lb)   Height: 1.753 m (5' 9\")     BMI= Body mass index is 23.04 kg/(m^2).   Wt Readings from Last 10 Encounters:   12/07/17 70.8 kg (156 lb)   09/08/17 69.4 kg (153 lb)   07/06/17 69.4 kg (153 lb)   01/23/17 75.1 kg (165 lb 9.6 oz)   01/20/17 73.5 kg (162 lb)   11/03/16 76.8 kg (169 lb 6.4 oz)   08/15/16 72.6 kg (160 lb)   08/02/16 73.8 kg (162 lb 9.6 oz)   07/25/16 73.4 kg (161 lb 13.1 oz)   07/14/16 73.5 kg (162 lb)     Appearance: Male appearance and dress    GENERAL:: healthy, alert and no distress  RESP: lungs clear to auscultation - no rales, no rhonchi, no wheezes  CV: regular rates and " rhythm, normal S1 S2, no S3 or S4 and no murmur, no click or rub -  Voice, skin/ per flowsheet    Affect: Appropriate/mood-congruent           Labs:   Results from last visit:  Orders Only on 11/29/2017   Component Date Value Ref Range Status     Testosterone Total 11/29/2017 740  240 - 950 ng/dL Final    Comment: This test was developed and its performance characteristics determined by the   [facility],  Special Chemistry Laboratory. It has   not been cleared or approved by the FDA. The laboratory is regulated under   CLIA as qualified to perform high-complexity testing. This test is used for   clinical purposes. It should not be regarded as investigational or for   research.       Sex Hormone Binding Globulin 11/29/2017 32  11 - 80 nmol/L Final     Free Testosterone Calculated 11/29/2017 16.97  4.7 - 24.4 ng/dL Final     FASTING SPECIMEN 11/29/2017 yes   Final     Glucose 11/29/2017 91.0  60.0 - 109.0 mg/dL Final     ALT 11/29/2017 9.0  0.0 - 70.0 U/L Final     AST 11/29/2017 34.0  0.0 - 55.0 U/L Final     Cholesterol 11/29/2017 158.0  0.0 - 200.0 Final     HDL Cholesterol 11/29/2017 57.0  >40.0 Final     Triglycerides 11/29/2017 91.0  0.0 - 150.0 Final     Cholesterol/HDL Ratio 11/29/2017 2.8  0.0 - 5.0 Final     LDL Cholesterol Direct 11/29/2017 83.0  0.0 - 129.0 Final     VLDL-Cholesterol 11/29/2017 18.0  7.0 - 32.0 Final     Hemoglobin 11/29/2017 15.8  13.3 - 17.7 g/dL Final     Day 3-4 after injection  Assessment and Plan   1. Gender dysphoria  Masculinizing well on current hormone dose, testosterone in normal male range.  No changes      Not currently sexually active  Contraception:   not needed  Health Maintenance:  Pap Smear Report(most recent only)       When: due, recommended--reviewed screening guidelines with aptient    hpv vaccinated      Follow up:  Follow up in 6 months.  Results by mychart  Questions were elicited and answered.     Stalin Gibson MD

## 2017-12-08 ENCOUNTER — NURSE TRIAGE (OUTPATIENT)
Dept: NURSING | Facility: CLINIC | Age: 22
End: 2017-12-08

## 2017-12-08 ENCOUNTER — TELEPHONE (OUTPATIENT)
Dept: FAMILY MEDICINE | Facility: CLINIC | Age: 22
End: 2017-12-08

## 2017-12-09 NOTE — TELEPHONE ENCOUNTER
"  Reason for Disposition    Caller has NON-URGENT medication question about med that PCP prescribed and triager unable to answer question    Additional Information    Negative: Drug overdose and nurse unable to answer question    Negative: Caller requesting information not related to medicine    Negative: Caller requesting a prescription for Strep throat and has a positive culture result    Negative: Rash while taking a medication or within 3 days of stopping it    Negative: Immunization reaction suspected    Negative: [1] Asthma and [2] having symptoms of asthma (cough, wheezing, etc)    Negative: MORE THAN A DOUBLE DOSE of a prescription or over-the-counter (OTC) drug    Negative: [1] DOUBLE DOSE (an extra dose or lesser amount) of over-the-counter (OTC) drug AND [2] any symptoms (e.g., dizziness, nausea, pain, sleepiness)    Negative: [1] DOUBLE DOSE (an extra dose or lesser amount) of prescription drug AND [2] any symptoms (e.g., dizziness, nausea, pain, sleepiness)    Negative: Took another person's prescription drug    Negative: [1] DOUBLE DOSE (an extra dose or lesser amount) of prescription drug AND [2] NO symptoms (Exception: a double dose of antibiotics)    Negative: Diabetes drug error or overdose (e.g., insulin or extra dose)    Negative: [1] Request for URGENT new prescription or refill of \"essential\" medication (i.e., likelihood of harm to patient if not taken) AND [2] triager unable to fill per unit policy    Negative: [1] Prescription not at pharmacy AND [2] was prescribed today by PCP    Negative: Pharmacy calling with prescription questions and triager unable to answer question    Negative: Caller has URGENT medication question about med that PCP prescribed and triager unable to answer question    Protocols used: MEDICATION QUESTION CALL-ADULT-    Patient's mother reports that the patient had been prescribed testosterone by Dr. Gibson.  Per Dr. Gibson, the medication could not be called into " the pharmacy due to it being a controlled substance.  Patient's mother spoke with their pharmacist who reports that the medication is not controlled.  Writer offered to fax over information as listed in patient's chart, but advised that, ultimately, the patient would need to speak with Dr. Gibson in regards to the issue.  Writer will send a note to clinic on patient's behalf per his mother's request.    Kaycee Branch RN  [facility]

## 2017-12-09 NOTE — TELEPHONE ENCOUNTER
Clinic Action Needed:Yes  Reason for Call: Patient's mother reports that the patient was prescribed testosterone and was told that he would have to  the script due to the medication being a controlled substance.  She reports that she spoke with their pharmacist and was told that the medication is not a controlled substance and asks that provider please call in the medication to their primary pharmacy - [facility].  Please call patient's mother, Dea, to advise.  She may be reached at [phone].    Routed to: Dr. Gibson Care Team    Kaycee Branch, RN  [facility]

## 2017-12-18 ENCOUNTER — TELEPHONE (OUTPATIENT)
Dept: OTHER | Facility: OUTPATIENT CENTER | Age: 22
End: 2017-12-18

## 2017-12-18 DIAGNOSIS — Z78.9 FEMALE-TO-MALE TRANSGENDER PERSON: ICD-10-CM

## 2017-12-18 DIAGNOSIS — F64.0 GENDER DYSPHORIA IN ADOLESCENT AND ADULT: ICD-10-CM

## 2017-12-18 RX ORDER — TESTOSTERONE CYPIONATE 200 MG/ML
60 INJECTION, SOLUTION INTRAMUSCULAR WEEKLY
Qty: 2 ML | Refills: 3 | Status: SHIPPED | OUTPATIENT
Start: 2017-12-18 | End: 2018-02-12

## 2017-12-18 RX ORDER — TESTOSTERONE CYPIONATE 200 MG/ML
60 INJECTION, SOLUTION INTRAMUSCULAR WEEKLY
Qty: 2 ML | Refills: 3 | Status: CANCELLED | OUTPATIENT
Start: 2017-12-18

## 2017-12-18 NOTE — TELEPHONE ENCOUNTER
Depotestosterone 200 mg/ml Rx faxed to [facility] per request.    Marlen Rodriguez,CMA

## 2017-12-18 NOTE — TELEPHONE ENCOUNTER
Per patient's mother, pharmacy will accept faxed testosterone Rx.  Please either write new and I will fax or send.      Marlen Rodriguez,CMA

## 2018-01-19 ENCOUNTER — OFFICE VISIT (OUTPATIENT)
Dept: URGENT CARE | Facility: URGENT CARE | Age: 23
End: 2018-01-19
Payer: COMMERCIAL

## 2018-01-19 ENCOUNTER — MEDICAL CORRESPONDENCE (OUTPATIENT)
Dept: HEALTH INFORMATION MANAGEMENT | Facility: CLINIC | Age: 23
End: 2018-01-19

## 2018-01-19 VITALS
DIASTOLIC BLOOD PRESSURE: 61 MMHG | HEART RATE: 80 BPM | SYSTOLIC BLOOD PRESSURE: 129 MMHG | BODY MASS INDEX: 22.76 KG/M2 | OXYGEN SATURATION: 95 % | WEIGHT: 154.1 LBS | TEMPERATURE: 99.7 F

## 2018-01-19 DIAGNOSIS — R50.9 FEVER IN ADULT: ICD-10-CM

## 2018-01-19 DIAGNOSIS — J10.1 INFLUENZA A: Primary | ICD-10-CM

## 2018-01-19 LAB
FLUAV+FLUBV AG SPEC QL: NEGATIVE
FLUAV+FLUBV AG SPEC QL: POSITIVE
SPECIMEN SOURCE: ABNORMAL

## 2018-01-19 PROCEDURE — 87804 INFLUENZA ASSAY W/OPTIC: CPT | Performed by: PHYSICIAN ASSISTANT

## 2018-01-19 PROCEDURE — 99213 OFFICE O/P EST LOW 20 MIN: CPT | Performed by: PHYSICIAN ASSISTANT

## 2018-01-19 RX ORDER — OSELTAMIVIR PHOSPHATE 75 MG/1
75 CAPSULE ORAL 2 TIMES DAILY
Qty: 10 CAPSULE | Refills: 0 | Status: SHIPPED | OUTPATIENT
Start: 2018-01-19 | End: 2018-06-05

## 2018-01-19 ASSESSMENT — ENCOUNTER SYMPTOMS
SHORTNESS OF BREATH: 0
HEADACHES: 0
NAUSEA: 0
DIARRHEA: 0
VOMITING: 0
FOCAL WEAKNESS: 0
FEVER: 1
CHILLS: 0
ABDOMINAL PAIN: 0
MYALGIAS: 1

## 2018-01-19 NOTE — NURSING NOTE
"Chief Complaint   Patient presents with     Urgent Care     URI     PT states has some congestion, body aches,  and low grade fever. Pt has taken otc medications.        Initial /61 (BP Location: Right arm, Patient Position: Chair, Cuff Size: Adult Regular)  Pulse 80  Temp 99.7  F (37.6  C) (Tympanic)  Wt 154 lb 1.6 oz (69.9 kg)  SpO2 95%  BMI 22.76 kg/m2 Estimated body mass index is 22.76 kg/(m^2) as calculated from the following:    Height as of 12/7/17: 5' 9\" (1.753 m).    Weight as of this encounter: 154 lb 1.6 oz (69.9 kg).  Medication Reconciliation: unable or not appropriate to perform   Ca Castanon CMA ([facility]) 1/19/2018 12:23 PM    "

## 2018-01-19 NOTE — PROGRESS NOTES
HPI  January 19, 2018    HPI: Rohan Wilson is a 22 year old male who complains of moderate congestion, myalgias, fatigue, fever, & chills onset 2 days ago. Pt had sore throat and HA 1/12-1/16 but those symptoms resolved, and these symptoms started the next day 1/17. Symptoms are constant in duration. No treatments tried. Denies CP, SOB, abd pain, N/V/D, rash, or any other symptoms. Patient denies sick contacts.    Past Medical History:   Diagnosis Date     Congenital dislocation of hip, unilateral birth    Congenital hip dislocation     Varicella without mention of complication     varicella (6 lesions-age 6 weeks)     Volume depletion     dehydration with C. Diff-1998, dehydration with Rotovirus-2000     Past Surgical History:   Procedure Laterality Date     ESOPHAGOSCOPY, GASTROSCOPY, DUODENOSCOPY (EGD), COMBINED  2/9/2014    Procedure: COMBINED ESOPHAGOSCOPY, GASTROSCOPY, DUODENOSCOPY (EGD), BIOPSY SINGLE OR MULTIPLE;  Esophagoscopy,Gastroscopy,Duodenoscopy- room 240 with stomach biopsies via cold forceps.;  Surgeon: Carmelo Tidwell MD;  Location:  GI     TRANSGENDER MASTECTOMY Bilateral 7/25/2016    Procedure: TRANSGENDER MASTECTOMY;  Surgeon: Rosario Resendiz MD;  Location: [facility] OR     Social History   Substance Use Topics     Smoking status: Never Smoker     Smokeless tobacco: Never Used      Comment: no smokers in the home     Alcohol use No     Patient Active Problem List   Diagnosis     Stria     Major depressive disorder, recurrent episode, in partial remission (H)     Female-to-male transgender person     S/P mastectomy, bilateral     Family History   Problem Relation Age of Onset     CANCER Maternal Grandfather      breast     CANCER Paternal Grandmother      prostate     Neurologic Disorder Maternal Aunt      migraines     Neurologic Disorder Mother      migraines         GASTROINTESTINAL DISEASE Maternal Aunt      crohns     Allergies Father      Food allergy        Problem list,  Medication list, Allergies, and Medical/Social/Surgical histories reviewed in Psychiatric and updated as appropriate.      Review of Systems   Constitutional: Positive for fever and malaise/fatigue. Negative for chills.   HENT: Positive for congestion.    Respiratory: Negative for shortness of breath.    Cardiovascular: Negative for chest pain.   Gastrointestinal: Negative for abdominal pain, diarrhea, nausea and vomiting.   Musculoskeletal: Positive for myalgias.   Skin: Negative for rash.   Neurological: Negative for focal weakness and headaches.   All other systems reviewed and are negative.        Physical Exam   Constitutional: He is oriented to person, place, and time and well-developed, well-nourished, and in no distress.   HENT:   Head: Normocephalic and atraumatic.   Right Ear: Tympanic membrane, external ear and ear canal normal.   Left Ear: Tympanic membrane, external ear and ear canal normal.   Nose: Nose normal.   Mouth/Throat: Uvula is midline, oropharynx is clear and moist and mucous membranes are normal.   Cardiovascular: Normal rate, regular rhythm and normal heart sounds.    Pulmonary/Chest: Effort normal and breath sounds normal.   Musculoskeletal: Normal range of motion.   Neurological: He is alert and oriented to person, place, and time. Gait normal.   Skin: Skin is warm and dry.   Nursing note and vitals reviewed.      Vital Signs  /61 (BP Location: Right arm, Patient Position: Chair, Cuff Size: Adult Regular)  Pulse 80  Temp 99.7  F (37.6  C) (Tympanic)  Wt 154 lb 1.6 oz (69.9 kg)  SpO2 95%  BMI 22.76 kg/m2     Diagnostic Test Results:  Results for orders placed or performed in visit on 01/19/18 (from the past 24 hour(s))   Influenza A/B antigen   Result Value Ref Range    Influenza A/B Agn Specimen Nasal     Influenza A Positive (A) NEG^Negative    Influenza B Negative NEG^Negative       ASSESSMENT/PLAN      ICD-10-CM    1. Influenza A J10.1 oseltamivir (TAMIFLU) 75 MG capsule   2. Fever  in adult R50.9 Influenza A/B antigen      Suspect pt had other viral illness which resolved, and has had influenza x 2 days therefore will Rx Tamiflu. Other supportive treatments discussed.      I have discussed any lab or imaging results, the patient's diagnosis, and my plan of treatment with the patient and/or family. Patient is aware to come back in if with worsening symptoms or if no relief despite treatment plan.  Patient voiced understanding and had no further questions.       Follow Up: Return if symptoms worsen or fail to improve.    KLEVER Bingham, PAMaicoC  [facility]

## 2018-01-19 NOTE — MR AVS SNAPSHOT
After Visit Summary   1/19/2018    Rohan Wilson    MRN: [id number]           Patient Information     Date Of Birth          1995        Visit Information        Provider Department      1/19/2018 12:00 PM Monika Dela Cruz PA-C [facility]        Today's Diagnoses     Influenza A    -  1    Fever in adult           Follow-ups after your visit        Follow-up notes from your care team     Return if symptoms worsen or fail to improve.      Who to contact     If you have questions or need follow up information about today's clinic visit or your schedule please contact [facility] directly at [phone].  Normal or non-critical lab and imaging results will be communicated to you by MyChart, letter or phone within 4 business days after the clinic has received the results. If you do not hear from us within 7 days, please contact the clinic through HitchedPichart or phone. If you have a critical or abnormal lab result, we will notify you by phone as soon as possible.  Submit refill requests through BaroFold or call your pharmacy and they will forward the refill request to us. Please allow 3 business days for your refill to be completed.          Additional Information About Your Visit        MyChart Information     BaroFold gives you secure access to your electronic health record. If you see a primary care provider, you can also send messages to your care team and make appointments. If you have questions, please call your primary care clinic.  If you do not have a primary care provider, please call [phone] and they will assist you.        Care EveryWhere ID     This is your Care EveryWhere ID. This could be used by other organizations to access your [place] medical records  [id number]        Your Vitals Were     Pulse Temperature Pulse Oximetry BMI (Body Mass Index)          80 99.7  F (37.6  C) (Tympanic) 95% 22.76 kg/m2         Blood Pressure from Last 3  Encounters:   01/19/18 129/61   09/08/17 102/60   01/20/17 114/59    Weight from Last 3 Encounters:   01/19/18 154 lb 1.6 oz (69.9 kg)   09/08/17 153 lb (69.4 kg)   01/20/17 162 lb (73.5 kg)              We Performed the Following     Influenza A/B antigen          Today's Medication Changes          These changes are accurate as of: 1/19/18  1:01 PM.  If you have any questions, ask your nurse or doctor.               Start taking these medicines.        Dose/Directions    oseltamivir 75 MG capsule   Commonly known as:  TAMIFLU   Used for:  Influenza A   Started by:  Monika Dela Cruz PA-C        Dose:  75 mg   Take 1 capsule (75 mg) by mouth 2 times daily   Quantity:  10 capsule   Refills:  0            Where to get your medicines      These medications were sent to [facility] [place] - ROSALVA Santana - [address]   [address]  [address], [place] [address]     Phone:  [phone]     oseltamivir 75 MG capsule                Primary Care Provider Office Phone # Fax #    Rubia Sandoval [phone]       [address] [address]  [facility] [address]        Equal Access to Services     [facility] : Hadii hortensia schreiber hadasho Soomaali, waaxda luqadaha, qaybta kaalmada adeegyada, mary belle. So [facility] [phone].    ATENCIÓN: Si habla español, tiene a dodge disposición servicios gratuitos de asistencia lingüística. Llame al [phone].    We comply with applicable federal civil rights laws and [place] laws. We do not discriminate on the basis of race, color, national origin, age, disability, sex, sexual orientation, or gender identity.            Thank you!     Thank you for choosing [facility]  for your care. Our goal is always to provide you with excellent care. Hearing back from our patients is one way we can continue to improve our services. Please take a few minutes to complete the written survey that you may receive in  "the mail after your visit with us. Thank you!             Your Updated Medication List - Protect others around you: Learn how to safely use, store and throw away your medicines at www.disposemymeds.org.          This list is accurate as of: 1/19/18  1:01 PM.  Always use your most recent med list.                   Brand Name Dispense Instructions for use Diagnosis    acetaminophen 325 MG tablet    TYLENOL    100 tablet    Take 2 tablets (650 mg) by mouth every 4 hours as needed for other (mild pain)    Gender identity disorder in adolescents or adults       oseltamivir 75 MG capsule    TAMIFLU    10 capsule    Take 1 capsule (75 mg) by mouth 2 times daily    Influenza A       sertraline 100 MG tablet    ZOLOFT    90 tablet    Take 1 tablet (100 mg) by mouth daily    Major depressive disorder with single episode, in full remission (H)       Syringe/Needle (Disp) 23G X 1\" 1 ML Misc     50 each    To use with weekly IM injection    Gender dysphoria in adolescent and adult, Female-to-male transgender person       testosterone cypionate 200 MG/ML injection    DEPOTESTOTERONE    2 mL    Inject 0.3 mLs (60 mg) into the muscle once a week    Gender dysphoria in adolescent and adult, Female-to-male transgender person         "

## 2018-01-22 ENCOUNTER — NURSE TRIAGE (OUTPATIENT)
Dept: NURSING | Facility: CLINIC | Age: 23
End: 2018-01-22

## 2018-01-22 ENCOUNTER — TELEPHONE (OUTPATIENT)
Dept: URGENT CARE | Facility: URGENT CARE | Age: 23
End: 2018-01-22

## 2018-01-22 NOTE — TELEPHONE ENCOUNTER
Clinic Action Needed:Yes, Please call patient [phone]  Reason for Call:Mom Dea calling with patient. 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  Stating patient was seen in [facility] 1/19/18 and advised he would be contagious with influenza A for a week and to stay home.  Stating school requires a note.   Requesting note to state patient was diagnosed with influenza A and to excuse from classes through 1/24/18.  Patient will have fax number when Urgent Care calls back.  Routed to:[facility]    Jasmyn Simmons RN  [facility]

## 2018-01-22 NOTE — TELEPHONE ENCOUNTER
Clinic Action Needed:Yes, Please call patient [phone]  Reason for Call:Mom Dea calling with patient. 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  Stating patient was seen in [facility] 1/19/18 and advised he would be contagious with influenza A for a week and to stay home.  Stating school requires a note.   Requesting note to state patient was diagnosed with influenza A and to excuse from classes through 1/24/18.  Patient will have fax number when Urgent Care calls back.  Routed to:[facility]    Jasmyn Simmons RN  [facility]

## 2018-01-22 NOTE — TELEPHONE ENCOUNTER
Letter was written. Called number provided back, left message to call back for fax number.    Sujata Dominguez, Medical Assistant

## 2018-01-22 NOTE — LETTER
January 22, 2018      Rohan Wilson  [address] [place] [address]        To Whom It May Concern:    Rohan Wilson  was seen on 1/19/2018.  Please excuse him  until 1/24/2018 due to Influenza A.        Sincerely,        Samuel Laguna MD

## 2018-01-26 ENCOUNTER — OFFICE VISIT (OUTPATIENT)
Dept: URGENT CARE | Facility: URGENT CARE | Age: 23
End: 2018-01-26
Payer: COMMERCIAL

## 2018-01-26 VITALS
DIASTOLIC BLOOD PRESSURE: 60 MMHG | TEMPERATURE: 97.7 F | HEART RATE: 61 BPM | OXYGEN SATURATION: 97 % | SYSTOLIC BLOOD PRESSURE: 100 MMHG

## 2018-01-26 DIAGNOSIS — J10.1 INFLUENZA A: Primary | ICD-10-CM

## 2018-01-26 PROCEDURE — 99213 OFFICE O/P EST LOW 20 MIN: CPT | Performed by: PHYSICIAN ASSISTANT

## 2018-01-26 ASSESSMENT — ENCOUNTER SYMPTOMS
FOCAL WEAKNESS: 0
NAUSEA: 0
SHORTNESS OF BREATH: 0
ABDOMINAL PAIN: 0
FEVER: 0
VOMITING: 0
COUGH: 1
DIARRHEA: 0
HEADACHES: 1
CHILLS: 0

## 2018-01-26 NOTE — PROGRESS NOTES
HPI  January 26, 2018    HPI: Rohan Wilson is a 22 year old male who complains of moderate cough,  HA, & congestion onset 9 days ago. Pt was diagnosed with influenza A on 1/19 and many symptoms have resolved except these symptoms. He did take Tamiflu. Cough has improved. No fevers recently. Denies CP, SOB.    Past Medical History:   Diagnosis Date     Congenital dislocation of hip, unilateral birth    Congenital hip dislocation     Varicella without mention of complication     varicella (6 lesions-age 6 weeks)     Volume depletion     dehydration with C. Diff-1998, dehydration with Rotovirus-2000     Past Surgical History:   Procedure Laterality Date     ESOPHAGOSCOPY, GASTROSCOPY, DUODENOSCOPY (EGD), COMBINED  2/9/2014    Procedure: COMBINED ESOPHAGOSCOPY, GASTROSCOPY, DUODENOSCOPY (EGD), BIOPSY SINGLE OR MULTIPLE;  Esophagoscopy,Gastroscopy,Duodenoscopy- room 240 with stomach biopsies via cold forceps.;  Surgeon: Carmelo Tidwell MD;  Location:  GI     TRANSGENDER MASTECTOMY Bilateral 7/25/2016    Procedure: TRANSGENDER MASTECTOMY;  Surgeon: Rosario Resendiz MD;  Location:  OR     Social History   Substance Use Topics     Smoking status: Never Smoker     Smokeless tobacco: Never Used      Comment: no smokers in the home     Alcohol use No     Patient Active Problem List   Diagnosis     Stria     Major depressive disorder, recurrent episode, in partial remission (H)     Female-to-male transgender person     S/P mastectomy, bilateral     Family History   Problem Relation Age of Onset     CANCER Maternal Grandfather      breast     CANCER Paternal Grandmother      prostate     Neurologic Disorder Maternal Aunt      migraines     Neurologic Disorder Mother      migraines         GASTROINTESTINAL DISEASE Maternal Aunt      crohns     Allergies Father      Food allergy        Problem list, Medication list, Allergies, and Medical/Social/Surgical histories reviewed in EPIC and updated as  appropriate.      Review of Systems   Constitutional: Negative for chills and fever.   HENT: Positive for congestion.    Respiratory: Positive for cough. Negative for shortness of breath.    Cardiovascular: Negative for chest pain.   Gastrointestinal: Negative for abdominal pain, diarrhea, nausea and vomiting.   Skin: Negative for rash.   Neurological: Positive for headaches. Negative for focal weakness.   All other systems reviewed and are negative.        Physical Exam   Constitutional: He is oriented to person, place, and time and well-developed, well-nourished, and in no distress.   HENT:   Head: Normocephalic and atraumatic.   Right Ear: Tympanic membrane, external ear and ear canal normal.   Left Ear: Tympanic membrane, external ear and ear canal normal.   Nose: Nose normal.   Mouth/Throat: Uvula is midline, oropharynx is clear and moist and mucous membranes are normal.   Cardiovascular: Normal rate, regular rhythm and normal heart sounds.    Pulmonary/Chest: Effort normal and breath sounds normal.   Musculoskeletal: Normal range of motion.   Neurological: He is alert and oriented to person, place, and time. Gait normal.   Skin: Skin is warm and dry.   Nursing note and vitals reviewed.    Vital Signs  /60 (BP Location: Right arm, Patient Position: Chair, Cuff Size: Adult Regular)  Pulse 61  Temp 97.7  F (36.5  C) (Oral)  SpO2 97%     Diagnostic Test Results:  none     ASSESSMENT/PLAN      ICD-10-CM    1. Influenza A J10.1       Lungs CTAB, vitals normal, nontoxic appearance. Symptoms improving. No sign of pneumonia. Continue supportive measures.      I have discussed any lab or imaging results, the patient's diagnosis, and my plan of treatment with the patient and/or family. Patient is aware to come back in if with worsening symptoms or if no relief despite treatment plan.  Patient voiced understanding and had no further questions.       Follow Up: Data Unavailable    KLEVER Bingham,  [facility]

## 2018-01-26 NOTE — NURSING NOTE
"Chief Complaint   Patient presents with     Urgent Care     Cough     Pt had flu last week, now cough, chest pressure, congestion.       Initial /60 (BP Location: Right arm, Patient Position: Chair, Cuff Size: Adult Regular)  Pulse 61  Temp 97.7  F (36.5  C) (Oral)  SpO2 97% Estimated body mass index is 22.76 kg/(m^2) as calculated from the following:    Height as of 12/7/17: 5' 9\" (1.753 m).    Weight as of 1/19/18: 154 lb 1.6 oz (69.9 kg).  Medication Reconciliation: unable or not appropriate to perform   Sujata Dominguez Medical Assistant    "

## 2018-01-26 NOTE — MR AVS SNAPSHOT
After Visit Summary   1/26/2018    Rohan Wilson    MRN: [id number]           Patient Information     Date Of Birth          1995        Visit Information        Provider Department      1/26/2018 2:00 PM Monika Dela Cruz PA-C [facility]        Today's Diagnoses     Influenza A    -  1       Follow-ups after your visit        Who to contact     If you have questions or need follow up information about today's clinic visit or your schedule please contact [facility] directly at [phone].  Normal or non-critical lab and imaging results will be communicated to you by GrowBLOXhart, letter or phone within 4 business days after the clinic has received the results. If you do not hear from us within 7 days, please contact the clinic through Curbsyt or phone. If you have a critical or abnormal lab result, we will notify you by phone as soon as possible.  Submit refill requests through BidKind or call your pharmacy and they will forward the refill request to us. Please allow 3 business days for your refill to be completed.          Additional Information About Your Visit        MyChart Information     BidKind gives you secure access to your electronic health record. If you see a primary care provider, you can also send messages to your care team and make appointments. If you have questions, please call your primary care clinic.  If you do not have a primary care provider, please call [phone] and they will assist you.        Care EveryWhere ID     This is your Care EveryWhere ID. This could be used by other organizations to access your [place] medical records  [id number]        Your Vitals Were     Pulse Temperature Pulse Oximetry             61 97.7  F (36.5  C) (Oral) 97%          Blood Pressure from Last 3 Encounters:   01/26/18 100/60   01/19/18 129/61   09/08/17 102/60    Weight from Last 3 Encounters:   01/19/18 154 lb 1.6 oz (69.9 kg)   09/08/17 153 lb  (69.4 kg)   01/20/17 162 lb (73.5 kg)              Today, you had the following     No orders found for display       Primary Care Provider Office Phone # Fax #    uRbia Sandoval [phone]       [address]  [facility] [address]        Equal Access to Services     [facility]: Hadii aad ku hadasho Soomaali, waaxda luqadaha, qaybta kaalmada adeegyada, waxay idiin hayaan adeeg ozzyfederico laerendira . So [facility] [phone].    ATENCIÓN: Si habla español, tiene a dodge disposición servicios gratuitos de asistencia lingüística. Jayame al [phone].    We comply with applicable federal civil rights laws and [place] laws. We do not discriminate on the basis of race, color, national origin, age, disability, sex, sexual orientation, or gender identity.            Thank you!     Thank you for choosing [facility]  for your care. Our goal is always to provide you with excellent care. Hearing back from our patients is one way we can continue to improve our services. Please take a few minutes to complete the written survey that you may receive in the mail after your visit with us. Thank you!             Your Updated Medication List - Protect others around you: Learn how to safely use, store and throw away your medicines at www.disposemymeds.org.          This list is accurate as of 1/26/18  2:54 PM.  Always use your most recent med list.                   Brand Name Dispense Instructions for use Diagnosis    acetaminophen 325 MG tablet    TYLENOL    100 tablet    Take 2 tablets (650 mg) by mouth every 4 hours as needed for other (mild pain)    Gender identity disorder in adolescents or adults       oseltamivir 75 MG capsule    TAMIFLU    10 capsule    Take 1 capsule (75 mg) by mouth 2 times daily    Influenza A       sertraline 100 MG tablet    ZOLOFT    90 tablet    Take 1 tablet (100 mg) by mouth daily    Major depressive disorder with single episode, in full remission (H)        "Syringe/Needle (Disp) 23G X 1\" 1 ML Misc     50 each    To use with weekly IM injection    Gender dysphoria in adolescent and adult, Female-to-male transgender person       testosterone cypionate 200 MG/ML injection    DEPOTESTOTERONE    2 mL    Inject 0.3 mLs (60 mg) into the muscle once a week    Gender dysphoria in adolescent and adult, Female-to-male transgender person         "

## 2018-01-27 ENCOUNTER — TELEPHONE (OUTPATIENT)
Dept: URGENT CARE | Facility: URGENT CARE | Age: 23
End: 2018-01-27

## 2018-01-27 NOTE — TELEPHONE ENCOUNTER
[facility] Varicella Exposure 1/19/18 Notification    Reason for call  RN calling regarding a visit to the [facility] on Friday, January 19, 2018.  RN is call to inform the Patient of possible exposure to chicken pox and to ask you a few question and provider you with some information to ensure the safety of your family.  This is a urgent matter, please call us immediately at [phone].between 10 a.m. and 6:30 p.m.     Kunal Barragan RN  [facility] RN  Lung Nodule and ED Lab Result F/u RN  Epic pool (ED late result f/u RN): [id number]  [facility] INCIDENTAL RADIOLOGY F/U NURSES: [id number]  Ph# [phone]

## 2018-01-27 NOTE — TELEPHONE ENCOUNTER
Pt returned call at 1609 states he is told he had chicken pox as a child.  No other persons noted.    Mirela Renteria, RN  [facility] RN  Lung Nodule and ED Lab Result F/u RN  Epic pool (ED late result f/u RN): [id number]  FV INCIDENTAL RADIOLOGY F/U NURSES: [id number]  # [phone]

## 2018-01-31 ENCOUNTER — DOCUMENTATION ONLY (OUTPATIENT)
Dept: OTHER | Facility: OUTPATIENT CENTER | Age: 23
End: 2018-01-31

## 2018-02-07 ENCOUNTER — TELEPHONE (OUTPATIENT)
Dept: OTHER | Facility: OUTPATIENT CENTER | Age: 23
End: 2018-02-07

## 2018-02-07 DIAGNOSIS — F64.0 GENDER IDENTITY DISORDER IN ADOLESCENTS OR ADULTS: ICD-10-CM

## 2018-02-07 DIAGNOSIS — Z78.9 FEMALE-TO-MALE TRANSGENDER PERSON: ICD-10-CM

## 2018-02-07 DIAGNOSIS — F64.0 GENDER DYSPHORIA IN ADOLESCENT AND ADULT: ICD-10-CM

## 2018-02-07 RX ORDER — TESTOSTERONE CYPIONATE 200 MG/ML
60 INJECTION, SOLUTION INTRAMUSCULAR WEEKLY
Qty: 2 ML | Refills: 3 | Status: CANCELLED | OUTPATIENT
Start: 2018-02-07

## 2018-02-07 NOTE — TELEPHONE ENCOUNTER
Pharmacy is requesting a New Rx for patients Testosterone Cyp.  [facility] unable to transfer.    Last seen 12/7/17 - 6 mo follow up      Marlen Rodriguez CMA

## 2018-02-12 ENCOUNTER — DOCUMENTATION ONLY (OUTPATIENT)
Dept: OTHER | Facility: OUTPATIENT CENTER | Age: 23
End: 2018-02-12

## 2018-02-12 RX ORDER — TESTOSTERONE CYPIONATE 200 MG/ML
60 INJECTION, SOLUTION INTRAMUSCULAR WEEKLY
Qty: 2 ML | Refills: 0 | Status: SHIPPED | OUTPATIENT
Start: 2018-02-12 | End: 2018-05-01

## 2018-02-12 NOTE — PROGRESS NOTES
Hard Copy Rx: Testosterone Cypionate sent to patient / faxed to pharmacy.    Marlen Rodriguez,CMA

## 2018-02-12 NOTE — TELEPHONE ENCOUNTER
Rohan's mother calls today asking why it is taking so long for a new Rx to be written and sent.  Sent you the request last Thursday 2/7.  Not happy.  Patient aware that appointment and labs are needed for further refills.        Marlen Rodriguez,[facility]

## 2018-05-01 DIAGNOSIS — F64.0 GENDER DYSPHORIA IN ADOLESCENT AND ADULT: ICD-10-CM

## 2018-05-01 DIAGNOSIS — Z78.9 FEMALE-TO-MALE TRANSGENDER PERSON: ICD-10-CM

## 2018-05-01 RX ORDER — TESTOSTERONE CYPIONATE 200 MG/ML
60 INJECTION, SOLUTION INTRAMUSCULAR WEEKLY
Qty: 2 ML | Refills: 1 | Status: SHIPPED | OUTPATIENT
Start: 2018-05-01 | End: 2018-06-05

## 2018-05-01 NOTE — TELEPHONE ENCOUNTER
Requested Medication:  Testosterone Cyp  Dose:   0.3  Quantity:  200 MG  Refills:  0    Last seen at [facility]:  12/2017 - 6 mo follow up  Next Appointment with Provider:  N/A      Marlen Rodriguez CMA

## 2018-05-03 ENCOUNTER — CARE COORDINATION (OUTPATIENT)
Dept: OTHER | Facility: OUTPATIENT CENTER | Age: 23
End: 2018-05-03

## 2018-06-05 ENCOUNTER — OFFICE VISIT (OUTPATIENT)
Dept: OTHER | Facility: OUTPATIENT CENTER | Age: 23
End: 2018-06-05
Payer: COMMERCIAL

## 2018-06-05 VITALS
BODY MASS INDEX: 22.36 KG/M2 | HEART RATE: 58 BPM | DIASTOLIC BLOOD PRESSURE: 54 MMHG | SYSTOLIC BLOOD PRESSURE: 97 MMHG | WEIGHT: 151 LBS | HEIGHT: 69 IN

## 2018-06-05 DIAGNOSIS — Z78.9 FEMALE-TO-MALE TRANSGENDER PERSON: ICD-10-CM

## 2018-06-05 DIAGNOSIS — F64.0 GENDER DYSPHORIA IN ADOLESCENT AND ADULT: ICD-10-CM

## 2018-06-05 RX ORDER — TESTOSTERONE CYPIONATE 200 MG/ML
60 INJECTION, SOLUTION INTRAMUSCULAR WEEKLY
Qty: 2 ML | Refills: 3 | Status: SHIPPED | OUTPATIENT
Start: 2018-06-05 | End: 2018-11-20

## 2018-06-05 ASSESSMENT — ENCOUNTER SYMPTOMS
VOMITING: 0
FREQUENCY: 0
FEVER: 0
WEAKNESS: 0
CHEST TIGHTNESS: 0
ABDOMINAL PAIN: 0
SHORTNESS OF BREATH: 0
NERVOUS/ANXIOUS: 0
DYSPHORIC MOOD: 0
UNEXPECTED WEIGHT CHANGE: 0
LIGHT-HEADEDNESS: 0
PALPITATIONS: 0
HEADACHES: 0
NAUSEA: 0
CHILLS: 0
NUMBNESS: 0
POLYDIPSIA: 0

## 2018-06-05 NOTE — PROGRESS NOTES
"       MAGDALENE Madrigal is a 23 year old individual that uses pronouns He/Him/His/Himself that presents today for follow up of:  masculinizing hormone therapy.   Gender identity: male.   Started Hormone  therapy  11/2015  Continues on Depo Testosterone 60 mg IM weekly  .  Any special concerns today?    No new concerns  On hormones?  YES +++ Shot day of the week? Sunday      Due for labs?  Yes      +++ Refills of meds needed?  Yes  Gender related body changes since last visit:   No specific changes      Breakthrough bleeding? No:   Activity: 6x/wk, cardio and strength training  New health concerns since last visit:  No new concerns  ---    Past Surgical History:   Procedure Laterality Date     ESOPHAGOSCOPY, GASTROSCOPY, DUODENOSCOPY (EGD), COMBINED  2/9/2014    Procedure: COMBINED ESOPHAGOSCOPY, GASTROSCOPY, DUODENOSCOPY (EGD), BIOPSY SINGLE OR MULTIPLE;  Esophagoscopy,Gastroscopy,Duodenoscopy- room 240 with stomach biopsies via cold forceps.;  Surgeon: Carmelo Tidwell MD;  Location:  GI     TRANSGENDER MASTECTOMY Bilateral 7/25/2016    Procedure: TRANSGENDER MASTECTOMY;  Surgeon: Rosario Resendiz MD;  Location: [facility]       Patient Active Problem List   Diagnosis     Stria     Major depressive disorder, recurrent episode, in partial remission (H)     Female-to-male transgender person     S/P mastectomy, bilateral       Current Outpatient Prescriptions   Medication Sig Dispense Refill     sertraline (ZOLOFT) 100 MG tablet Take 1 tablet (100 mg) by mouth daily 90 tablet 3     Syringe/Needle, Disp, 23G X 1\" 1 ML MISC To use with weekly IM injection 50 each 3     testosterone cypionate (DEPOTESTOTERONE) 200 MG/ML injection Inject 0.3 mLs (60 mg) into the muscle once a week 2 mL 1     acetaminophen (TYLENOL) 325 MG tablet Take 2 tablets (650 mg) by mouth every 4 hours as needed for other (mild pain) 100 tablet 0       History   Smoking Status     Never Smoker   Smokeless Tobacco     Never Used     Comment: no " "smokers in the home          Allergies   Allergen Reactions     Amitriptyline-Perphenazine [Perphenazine-Amitriptyline] GI Disturbance     Pt developed an ulcer after taking med       Health Maintenance Due   Topic Date Due     MIGRAINE ACTION PLAN  02/22/2014     HIV SCREEN (SYSTEM ASSIGNED)  02/22/2014     DEPRESSION ACTION PLAN Q1 YR  03/03/2016     TETANUS IMMUNIZATION (SYSTEM ASSIGNED)  08/02/2017     PHQ-9 Q3 MONTHS  12/08/2017     Mental Health Tx Plan Q2 MOS  01/11/2018         Problem, Medication and Allergy Lists were reviewed and are current..         Review of Systems:   Review of Systems   Constitutional: Negative for chills, fever and unexpected weight change.   Eyes: Negative for visual disturbance.   Respiratory: Negative for chest tightness and shortness of breath.    Cardiovascular: Negative for chest pain, palpitations and leg swelling.   Gastrointestinal: Negative for abdominal pain, nausea and vomiting.   Endocrine: Negative for polydipsia and polyuria.   Genitourinary: Negative for frequency.   Neurological: Negative for weakness, light-headedness, numbness and headaches.   Psychiatric/Behavioral: Negative for dysphoric mood and suicidal ideas. The patient is not nervous/anxious.             Physical Exam:     Vitals:    06/05/18 1031   BP: 97/54   Pulse: 58   Weight: 68.5 kg (151 lb)   Height: 1.753 m (5' 9\")     BMI= Body mass index is 22.3 kg/(m^2).   Wt Readings from Last 10 Encounters:   06/05/18 68.5 kg (151 lb)   01/19/18 69.9 kg (154 lb 1.6 oz)   12/07/17 70.8 kg (156 lb)   09/08/17 69.4 kg (153 lb)   07/06/17 69.4 kg (153 lb)   01/23/17 75.1 kg (165 lb 9.6 oz)   01/20/17 73.5 kg (162 lb)   11/03/16 76.8 kg (169 lb 6.4 oz)   08/15/16 72.6 kg (160 lb)   08/02/16 73.8 kg (162 lb 9.6 oz)     Appearance: Male appearance and dress    GENERAL:: healthy, alert and no distress  RESP: lungs clear to auscultation - no rales, no rhonchi, no wheezes  CV: regular rates and rhythm, normal S1 S2, no " S3 or S4 and no murmur, no click or rub -  No acne  Affect: Appropriate/mood-congruent           Labs:   Results from last visit:  Office Visit on 01/19/2018   Component Date Value Ref Range Status     Influenza A/B Agn Specimen 01/19/2018 Nasal   Final     Influenza A 01/19/2018 Positive* NEG^Negative Final     Influenza B 01/19/2018 Negative  NEG^Negative Final    Comment: Test results must be correlated with clinical data. If necessary, results   should be confirmed by a molecular assay or viral culture.       Last hormone labs 11/2017    Assessment and Plan   1. Gender dysphoria  Stable masculinization on current dose hormones  No changes in dose  Check hgb, Lipid panel plus, testosterone level prior to next visit      Not currently sexually active  Contraception:   not needed  Health Maintenance:  Pap Smear Report(most recent only)       When: still due--  Reviewed guidelines for cervical cancer screening and recommended Pap testing, at minimum HPV swab with PCP or here.       Follow up:  Follow up in 6 months.  Results by mychart  Questions were elicited and answered.     Stalin Gibson MD

## 2018-06-05 NOTE — NURSING NOTE
"Chief Complaint   Patient presents with     RECHECK     TG       Vitals:    06/05/18 1031   BP: 97/54   Pulse: 58   Weight: 68.5 kg (151 lb)   Height: 1.753 m (5' 9\")       Body mass index is 22.3 kg/(m^2).      Marlen Rodriguez CMA    "

## 2018-06-05 NOTE — MR AVS SNAPSHOT
"              After Visit Summary   6/5/2018    Rohan Wilson    MRN: [id number]           Patient Information     Date Of Birth          1995        Visit Information        Provider Department      6/5/2018 10:20 AM Stalin Gibson MD [facility]        Today's Diagnoses     Gender dysphoria in adolescent and adult        Female-to-male transgender person           Follow-ups after your visit        Follow-up notes from your care team     Return in about 6 months (around 12/5/2018).      Who to contact     Please call your clinic at [phone] to:    Ask questions about your health    Make or cancel appointments    Discuss your medicines    Learn about your test results    Speak to your doctor            Additional Information About Your Visit        We Are Huntedhart Information     Enlighted gives you secure access to your electronic health record. If you see a primary care provider, you can also send messages to your care team and make appointments. If you have questions, please call your primary care clinic.  If you do not have a primary care provider, please call [phone] and they will assist you.      Enlighted is an electronic gateway that provides easy, online access to your medical records. With Enlighted, you can request a clinic appointment, read your test results, renew a prescription or communicate with your care team.     To access your existing account, please contact your [facility] or call [phone] for assistance.        Care EveryWhere ID     This is your Care EveryWhere ID. This could be used by other organizations to access your [place] medical records  [id number]        Your Vitals Were     Pulse Height BMI (Body Mass Index)             58 1.753 m (5' 9\") 22.3 kg/m2          Blood Pressure from Last 3 Encounters:   06/05/18 97/54   01/26/18 100/60   01/19/18 129/61    Weight from Last 3 Encounters:   06/05/18 68.5 kg (151 lb)   01/19/18 69.9 " "kg (154 lb 1.6 oz)   12/07/17 70.8 kg (156 lb)                 Where to get your medicines      These medications were sent to [facility]/pharmacy #[id number] - [place], [place] - [address], [place] [address]     Phone:  [phone]     Syringe/Needle (Disp) 23G X 1\" 1 ML Misc         Some of these will need a paper prescription and others can be bought over the counter.  Ask your nurse if you have questions.     Bring a paper prescription for each of these medications     testosterone cypionate 200 MG/ML injection          Primary Care Provider Office Phone # Fax #    Rubia Sandoval [phone]       Jorge BRITO NICOL[facility] [address]  [facility] [address]        Equal Access to Services     INGRID HERRERA : Preet guerreroo Sotimothy, waaxda luqadaha, qaybta kaalmada adeegyada, mary bennett . So [facility] [phone].    ATENCIÓN: Si habla español, tiene a dodge disposición servicios gratuitos de asistencia lingüística. [facility] [phone].    We comply with applicable federal civil rights laws and [place] laws. We do not discriminate on the basis of race, color, national origin, age, disability, sex, sexual orientation, or gender identity.            Thank you!     Thank you for choosing [facility]  for your care. Our goal is always to provide you with excellent care. Hearing back from our patients is one way we can continue to improve our services. Please take a few minutes to complete the written survey that you may receive in the mail after your visit with us. Thank you!             Your Updated Medication List - Protect others around you: Learn how to safely use, store and throw away your medicines at www.disposemymeds.org.          This list is accurate as of 6/5/18 11:59 PM.  Always use your most recent med list.                   Brand Name Dispense Instructions for use Diagnosis    acetaminophen 325 MG tablet    TYLENOL    100 tablet    " "Take 2 tablets (650 mg) by mouth every 4 hours as needed for other (mild pain)    Gender identity disorder in adolescents or adults       sertraline 100 MG tablet    ZOLOFT    90 tablet    Take 1 tablet (100 mg) by mouth daily    Major depressive disorder with single episode, in full remission (H)       Syringe/Needle (Disp) 23G X 1\" 1 ML Misc     50 each    To use with weekly IM injection    Gender dysphoria in adolescent and adult, Female-to-male transgender person       testosterone cypionate 200 MG/ML injection    DEPOTESTOTERONE    2 mL    Inject 0.3 mLs (60 mg) into the muscle once a week    Gender dysphoria in adolescent and adult, Female-to-male transgender person         "

## 2018-08-27 DIAGNOSIS — F32.5 MAJOR DEPRESSIVE DISORDER WITH SINGLE EPISODE, IN FULL REMISSION (H): ICD-10-CM

## 2018-08-28 NOTE — TELEPHONE ENCOUNTER
"Requested Prescriptions   Pending Prescriptions Disp Refills     sertraline (ZOLOFT) 100 MG tablet [Pharmacy Med Name: SERTRALINE  MG TABLET]  Last Written Prescription Date:  9/8/17  Last Fill Quantity: 90,  # refills: 3   Last office visit: 9/8/2017 with prescribing provider:  Jaime   Future Office Visit:   Next 5 appointments (look out 90 days)     Sep 14, 2018 11:00 AM CDT   SHORT with Rubia Sandoval MD   [facility]    [address]  [facility] [address]   [phone]                  90 tablet 3     Sig: TAKE 1 TABLET (100 MG) BY MOUTH DAILY    SSRIs Protocol Failed    8/27/2018 10:07 PM       Failed - PHQ-9 score less than 5 in past 6 months    Please review last PHQ-9 score.          Passed - Patient is age 18 or older       Passed - Recent (6 mo) or future (30 days) visit within the authorizing provider's specialty    Patient had office visit in the last 6 months or has a visit in the next 30 days with authorizing provider or within the authorizing provider's specialty.  See \"Patient Info\" tab in inbasket, or \"Choose Columns\" in Meds & Orders section of the refill encounter.              "

## 2018-08-31 RX ORDER — SERTRALINE HYDROCHLORIDE 100 MG/1
TABLET, FILM COATED ORAL
Qty: 30 TABLET | Refills: 0 | Status: SHIPPED | OUTPATIENT
Start: 2018-08-31 | End: 2018-09-14

## 2018-08-31 NOTE — TELEPHONE ENCOUNTER
PHQ-9 score:    PHQ-9 SCORE 9/10/2017   Total Score -   Total Score 0   Some encounter information is confidential and restricted. Go to Review Flowsheets activity to see all data.     Call received from Mom inquiring about refill. States they are going out of town for the weekend. Consent to communicate on file but name does not match chart. Advised need to speak with patient.  Call received from patient. Advised need PHQ9 updated every 6 months. Patient stated that he has upcoming appointment. Writer had not seen this below. Advised will refill medication until appointment and PHQ9 can be done at appointment. Advised consent to communicate be updated when patient comes in for appointment. Patient agrees.

## 2018-09-14 ENCOUNTER — OFFICE VISIT (OUTPATIENT)
Dept: INTERNAL MEDICINE | Facility: CLINIC | Age: 23
End: 2018-09-14
Payer: COMMERCIAL

## 2018-09-14 VITALS
BODY MASS INDEX: 22.86 KG/M2 | DIASTOLIC BLOOD PRESSURE: 64 MMHG | RESPIRATION RATE: 16 BRPM | WEIGHT: 154.8 LBS | TEMPERATURE: 98.1 F | SYSTOLIC BLOOD PRESSURE: 102 MMHG | HEART RATE: 83 BPM | OXYGEN SATURATION: 98 %

## 2018-09-14 DIAGNOSIS — F32.5 MAJOR DEPRESSIVE DISORDER WITH SINGLE EPISODE, IN FULL REMISSION (H): Primary | ICD-10-CM

## 2018-09-14 DIAGNOSIS — J01.00 ACUTE NON-RECURRENT MAXILLARY SINUSITIS: ICD-10-CM

## 2018-09-14 DIAGNOSIS — Z23 NEED FOR TD VACCINE: ICD-10-CM

## 2018-09-14 DIAGNOSIS — Z13.6 CARDIOVASCULAR SCREENING; LDL GOAL LESS THAN 130: ICD-10-CM

## 2018-09-14 DIAGNOSIS — J06.9 VIRAL URI: ICD-10-CM

## 2018-09-14 PROCEDURE — 99214 OFFICE O/P EST MOD 30 MIN: CPT | Mod: 25 | Performed by: INTERNAL MEDICINE

## 2018-09-14 PROCEDURE — 90714 TD VACC NO PRESV 7 YRS+ IM: CPT | Performed by: INTERNAL MEDICINE

## 2018-09-14 PROCEDURE — 90471 IMMUNIZATION ADMIN: CPT | Performed by: INTERNAL MEDICINE

## 2018-09-14 RX ORDER — AZITHROMYCIN 250 MG/1
TABLET, FILM COATED ORAL
Qty: 6 TABLET | Refills: 0 | Status: SHIPPED | OUTPATIENT
Start: 2018-09-14 | End: 2018-11-27

## 2018-09-14 RX ORDER — SERTRALINE HYDROCHLORIDE 100 MG/1
100 TABLET, FILM COATED ORAL DAILY
Qty: 90 TABLET | Refills: 3 | Status: SHIPPED | OUTPATIENT
Start: 2018-09-14 | End: 2020-03-05

## 2018-09-14 NOTE — NURSING NOTE
/64  Pulse 83  Temp 98.1  F (36.7  C) (Oral)  Resp 16  Wt 154 lb 12.8 oz (70.2 kg)  SpO2 98%  BMI 22.86 kg/m2    Pt decline flu vaccine.

## 2018-09-14 NOTE — PATIENT INSTRUCTIONS
Take sudafed from the pharmacy am, with lunch and with supper. If you have significant nasal stuffiness at night, you can use afrin spray for no more than 4 nights.      If you start making more mucus, start mucinex.     If not improving after several days, start a nasal steroid spray over the counter like Flonase or Nasacort. It can take 4-5 days to start working.     If significant pain, very heavy drainage, start the antibiotic.

## 2018-09-14 NOTE — MR AVS SNAPSHOT
After Visit Summary   9/14/2018    Rohan Wilson    MRN: [id number]           Patient Information     Date Of Birth          1995        Visit Information        Provider Department      9/14/2018 11:00 AM Rubia Sandoval MD [facility]        Today's Diagnoses     Major depressive disorder with single episode, in full remission (H)    -  1    Need for TD vaccine        Acute non-recurrent maxillary sinusitis          Care Instructions    Take sudafed from the pharmacy am, with lunch and with supper. If you have significant nasal stuffiness at night, you can use afrin spray for no more than 4 nights.      If you start making more mucus, start mucinex.     If not improving after several days, start a nasal steroid spray over the counter like Flonase or Nasacort. It can take 4-5 days to start working.     If significant pain, very heavy drainage, start the antibiotic.             Follow-ups after your visit        Who to contact     If you have questions or need follow up information about today's clinic visit or your schedule please contact [facility] directly at [phone].  Normal or non-critical lab and imaging results will be communicated to you by PrivateGriffehart, letter or phone within 4 business days after the clinic has received the results. If you do not hear from us within 7 days, please contact the clinic through PrivateGriffehart or phone. If you have a critical or abnormal lab result, we will notify you by phone as soon as possible.  Submit refill requests through O-film or call your pharmacy and they will forward the refill request to us. Please allow 3 business days for your refill to be completed.          Additional Information About Your Visit        MyChart Information     O-film gives you secure access to your electronic health record. If you see a primary care provider, you can also send messages to your care team and make appointments. If you have  questions, please call your primary care clinic.  If you do not have a primary care provider, please call [phone] and they will assist you.        Care EveryWhere ID     This is your Care EveryWhere ID. This could be used by other organizations to access your [place] medical records  [id number]        Your Vitals Were     Pulse Temperature Respirations Pulse Oximetry BMI (Body Mass Index)       83 98.1  F (36.7  C) (Oral) 16 98% 22.86 kg/m2        Blood Pressure from Last 3 Encounters:   09/14/18 102/64   06/05/18 97/54   01/26/18 100/60    Weight from Last 3 Encounters:   09/14/18 154 lb 12.8 oz (70.2 kg)   06/05/18 151 lb (68.5 kg)   01/19/18 154 lb 1.6 oz (69.9 kg)              We Performed the Following     TD (ADULT, 7+) PRESERVE FREE          Today's Medication Changes          These changes are accurate as of 9/14/18 11:40 AM.  If you have any questions, ask your nurse or doctor.               Start taking these medicines.        Dose/Directions    azithromycin 250 MG tablet   Commonly known as:  ZITHROMAX   Used for:  Acute non-recurrent maxillary sinusitis   Started by:  Rubia Sandoval MD        Two tablets first day, then one tablet daily for four days.   Quantity:  6 tablet   Refills:  0            Where to get your medicines      These medications were sent to [facility]/pharmacy #[id number] [place], [place] - [address], [place] [address]     Phone:  [phone]     sertraline 100 MG tablet         Some of these will need a paper prescription and others can be bought over the counter.  Ask your nurse if you have questions.     Bring a paper prescription for each of these medications     azithromycin 250 MG tablet                Primary Care Provider Office Phone # Fax #    Rubia Sandoval [phone]       [address] [place], [place] [address]SANDRO [facility] [address]  [facility] [address]        Equal Access to Services     INGRID HERRERA AH: narinder Crane, yessy  "marty redmary kendall pierrerocio lintonlukas barbra. So [facility] [phone].    ATENCIÓN: Si leonarda mujica, tiene a dodge disposición servicios gratuitos de asistencia lingüística. Manan al [phone].    We comply with applicable federal civil rights laws and [place] laws. We do not discriminate on the basis of race, color, national origin, age, disability, sex, sexual orientation, or gender identity.            Thank you!     Thank you for choosing [facility]  for your care. Our goal is always to provide you with excellent care. Hearing back from our patients is one way we can continue to improve our services. Please take a few minutes to complete the written survey that you may receive in the mail after your visit with us. Thank you!             Your Updated Medication List - Protect others around you: Learn how to safely use, store and throw away your medicines at www.disposemymeds.org.          This list is accurate as of 9/14/18 11:40 AM.  Always use your most recent med list.                   Brand Name Dispense Instructions for use Diagnosis    azithromycin 250 MG tablet    ZITHROMAX    6 tablet    Two tablets first day, then one tablet daily for four days.    Acute non-recurrent maxillary sinusitis       sertraline 100 MG tablet    ZOLOFT    90 tablet    Take 1 tablet (100 mg) by mouth daily    Major depressive disorder with single episode, in full remission (H)       Syringe/Needle (Disp) 23G X 1\" 1 ML Misc     50 each    To use with weekly IM injection    Gender dysphoria in adolescent and adult, Female-to-male transgender person       testosterone cypionate 200 MG/ML injection    DEPOTESTOTERONE    2 mL    Inject 0.3 mLs (60 mg) into the muscle once a week    Gender dysphoria in adolescent and adult, Female-to-male transgender person         "

## 2018-09-14 NOTE — PROGRESS NOTES
"  SUBJECTIVE:   Rohan Wilson is a 23 year old male who presents to clinic today for the following health issues:      Depression Followup    Status since last visit: Stable he is still continuing school and feels that he would like to stay on the antidepressants.  He is not having any side effects or issues.    See PHQ-9 for current symptoms.  Other associated symptoms: None    Complicating factors:   Significant life event:  No   Current substance abuse:  None  Anxiety or Panic symptoms:  No    PHQ-9 7/8/2016 11/3/2016 9/10/2017   Total Score 1 1 0   Q9: Suicide Ideation Not at all Not at all Not at all       PHQ-9  English  PHQ-9   Any Language  Suicide Assessment Five-step Evaluation and Treatment (SAFE-T)    Amount of exercise or physical activity: 6-7 days/week for an average of greater than 60 minutes    Problems taking medications regularly: No    Medication side effects: none    Diet: regular (no restrictions)      He also complains some nasal congestion and facial pressure about 2 weeks.  This started with an feeling very tired all the time, having some intermittent headaches and some nasal congestion.  He tried Claritin about 6 days without relief.  He is having very minimal postnasal drainage, no sore throat.  He feels slightly warm at times but no true fever, minimal cough.  No chest pain, shortness of breath, abdominal symptoms, enlarged lymph nodes.      Patient Active Problem List   Diagnosis     Stria     Major depressive disorder, recurrent episode, in partial remission (H)     Female-to-male transgender person     S/P mastectomy, bilateral     Current Outpatient Prescriptions   Medication Sig Dispense Refill     azithromycin (ZITHROMAX) 250 MG tablet Two tablets first day, then one tablet daily for four days. 6 tablet 0     sertraline (ZOLOFT) 100 MG tablet Take 1 tablet (100 mg) by mouth daily 90 tablet 3     Syringe/Needle, Disp, 23G X 1\" 1 ML MISC To use with weekly IM injection 50 each 3 "     testosterone cypionate (DEPOTESTOTERONE) 200 MG/ML injection Inject 0.3 mLs (60 mg) into the muscle once a week 2 mL 3      Social History   Substance Use Topics     Smoking status: Never Smoker     Smokeless tobacco: Never Used      Comment: no smokers in the home     Alcohol use No             ROS:  No fever, chills, ear pain, facial pain, rhinorrhea, abdominal pain    OBJECTIVE:     /64  Pulse 83  Temp 98.1  F (36.7  C) (Oral)  Resp 16  Wt 154 lb 12.8 oz (70.2 kg)  SpO2 98%  BMI 22.86 kg/m2  Body mass index is 22.86 kg/(m^2).      TM's are clear  Nasal mucosa with moderate edema, erythema. Mucus is not  present,   Sinuses are without tenderness  Posterior pharynx without erythema, without exudate  Anterior cervical nodes are not present   Lungs are clear, wheezes are not present with forced expiration.     PHQ-9 SCORE 11/3/2016 9/10/2017 9/15/2018   Total Score - - -   Total Score 1 0 1         ASSESSMENT/PLAN:             1. Major depressive disorder with single episode, in full remission (H)  Well-controlled, continue medication, advised in the future if he is doing well and would like to try going off of it, he should contact me for weaning schedule.  - sertraline (ZOLOFT) 100 MG tablet; Take 1 tablet (100 mg) by mouth daily  Dispense: 90 tablet; Refill: 3    2. Viral URI  Advised to symptoms are suggestive of viral syndrome, not likely acute mono.  Not likely a bacterial infection at this time.   Recommend over-the-counter treatments with Sudafed for the congestion, Mucinex if he develops mucus, consider Flonase or Nasacort if persistent ingestion. I did provide him with an antibiotic prescription in case he develops more janae bacterial sinusitis.      Rubia Sandoval MD  [facility]

## 2018-09-15 PROBLEM — Z13.6 CARDIOVASCULAR SCREENING; LDL GOAL LESS THAN 130: Status: ACTIVE | Noted: 2018-09-15

## 2018-09-15 PROBLEM — Z90.13 S/P MASTECTOMY, BILATERAL: Status: RESOLVED | Noted: 2017-07-06 | Resolved: 2018-09-15

## 2018-09-16 ASSESSMENT — PATIENT HEALTH QUESTIONNAIRE - PHQ9: SUM OF ALL RESPONSES TO PHQ QUESTIONS 1-9: 1

## 2018-11-21 ENCOUNTER — TELEPHONE (OUTPATIENT)
Dept: OTHER | Facility: OUTPATIENT CENTER | Age: 23
End: 2018-11-21

## 2018-11-23 ENCOUNTER — OFFICE VISIT (OUTPATIENT)
Dept: DERMATOLOGY | Facility: CLINIC | Age: 23
End: 2018-11-23
Payer: COMMERCIAL

## 2018-11-23 VITALS — OXYGEN SATURATION: 97 % | HEART RATE: 71 BPM | DIASTOLIC BLOOD PRESSURE: 66 MMHG | SYSTOLIC BLOOD PRESSURE: 126 MMHG

## 2018-11-23 DIAGNOSIS — L65.9 ALOPECIA: Primary | ICD-10-CM

## 2018-11-23 LAB
DEPRECATED CALCIDIOL+CALCIFEROL SERPL-MC: 30 UG/L (ref 20–75)
ERYTHROCYTE [DISTWIDTH] IN BLOOD BY AUTOMATED COUNT: 12.4 % (ref 10–15)
FERRITIN SERPL-MCNC: 29 NG/ML (ref 26–388)
HCT VFR BLD AUTO: 49 % (ref 40–53)
HGB BLD-MCNC: 16.1 G/DL (ref 13.3–17.7)
IRON SERPL-MCNC: 140 UG/DL (ref 35–180)
MCH RBC QN AUTO: 33 PG (ref 26.5–33)
MCHC RBC AUTO-ENTMCNC: 32.9 G/DL (ref 31.5–36.5)
MCV RBC AUTO: 100 FL (ref 78–100)
PLATELET # BLD AUTO: 209 10E9/L (ref 150–450)
RBC # BLD AUTO: 4.88 10E12/L (ref 4.4–5.9)
TSH SERPL DL<=0.005 MIU/L-ACNC: 1.76 MU/L (ref 0.4–4)
WBC # BLD AUTO: 6.8 10E9/L (ref 4–11)

## 2018-11-23 PROCEDURE — 84443 ASSAY THYROID STIM HORMONE: CPT | Performed by: PHYSICIAN ASSISTANT

## 2018-11-23 PROCEDURE — 84425 ASSAY OF VITAMIN B-1: CPT | Mod: 90 | Performed by: PHYSICIAN ASSISTANT

## 2018-11-23 PROCEDURE — 82627 DEHYDROEPIANDROSTERONE: CPT | Performed by: PHYSICIAN ASSISTANT

## 2018-11-23 PROCEDURE — 84630 ASSAY OF ZINC: CPT | Mod: 90 | Performed by: PHYSICIAN ASSISTANT

## 2018-11-23 PROCEDURE — 99000 SPECIMEN HANDLING OFFICE-LAB: CPT | Performed by: PHYSICIAN ASSISTANT

## 2018-11-23 PROCEDURE — 85027 COMPLETE CBC AUTOMATED: CPT | Performed by: PHYSICIAN ASSISTANT

## 2018-11-23 PROCEDURE — 86038 ANTINUCLEAR ANTIBODIES: CPT | Performed by: PHYSICIAN ASSISTANT

## 2018-11-23 PROCEDURE — 36415 COLL VENOUS BLD VENIPUNCTURE: CPT | Performed by: PHYSICIAN ASSISTANT

## 2018-11-23 PROCEDURE — 99214 OFFICE O/P EST MOD 30 MIN: CPT | Performed by: PHYSICIAN ASSISTANT

## 2018-11-23 PROCEDURE — 82728 ASSAY OF FERRITIN: CPT | Performed by: PHYSICIAN ASSISTANT

## 2018-11-23 PROCEDURE — 82306 VITAMIN D 25 HYDROXY: CPT | Performed by: PHYSICIAN ASSISTANT

## 2018-11-23 PROCEDURE — 83540 ASSAY OF IRON: CPT | Performed by: PHYSICIAN ASSISTANT

## 2018-11-23 RX ORDER — FINASTERIDE 1 MG/1
TABLET, FILM COATED ORAL
Qty: 90 TABLET | Refills: 3 | Status: SHIPPED | OUTPATIENT
Start: 2018-11-23 | End: 2019-05-21

## 2018-11-23 NOTE — PROGRESS NOTES
HPI:   Rohan Wilson is a 23 year old male who presents for evaluation of hair loss. Has noticed shedding and thinning; more so over the past 6 months.  chief complaint  Location: top of scalp   Condition present for:  6 months.   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     Review Of Systems  Eyes: negative  Ears/Nose/Throat: negative  Respiratory: No shortness of breath, dyspnea on exertion, cough, or hemoptysis  Cardiovascular: negative  Gastrointestinal: negative  Genitourinary: negative  Musculoskeletal: negative  Neurologic: negative  Psychiatric: negative    This document serves as a record of the services and decisions personally performed and made by Sonja Fortune, MS, PA-C. It was created on her behalf by Kamryn Hogue, a trained medical scribe. The creation of this document is based on the provider's statements to the medical scribe.  Kamryn Hogue 10:45 AM November 23, 2018    PHYSICAL EXAM:    /66  Pulse 71  SpO2 97%  Skin exam performed as follows: Type 2 skin. Mood appropriate  Alert and Oriented X 3. Well developed, well nourished in no distress.  General appearance: Normal  Head including face: Normal  Eyes: conjunctiva and lids: Normal  Mouth: Lips, teeth, gums: Normal  Neck: Normal  Chest-breast/axillae: Normal  Back: Normal  Spleen and liver: Normal  Cardiovascular: Exam of peripheral vascular system by observation for swelling, varicosities, edema: Normal  Genitalia: groin, buttocks: Normal  Extremities: digits/nails (clubbing): Normal  Eccrine and Apocrine glands: Normal  Right upper extremity: Normal  Left upper extremity: Normal  Right lower extremity: Normal  Left lower extremity: Normal  Skin: Scalp and body hair: See below    1. Decreased density through vertex and temporal scalp. Scalp normal without erythema or scaling. Negative hair  pull.     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  Advised on natural course and progression secondary to hormones and genetics. Discussed starting Rogaine every day-BID vs Propecia. After discussion he would like to start with Propecia  --Start Propecia 1 tab PO daily    --Check labs today    Follow-up: yearly if doing well/PRN if struggling  CC:   Scribed By: Kamryn Hogue, Medical Scribe    The information in this document, created by the medical scribe for me, accurately reflects the services I personally performed and the decisions made by me. I have reviewed and approved this document for accuracy prior to leaving the patient care area.  November 23, 2018 10:52 AM    Sonja Fortune MS, PA-C

## 2018-11-23 NOTE — MR AVS SNAPSHOT
After Visit Summary   11/23/2018    Rohan Wilson    MRN: [id number]           Patient Information     Date Of Birth          1995        Visit Information        Provider Department      11/23/2018 10:30 AM Sonja Fortune PA-C [facility]        Today's Diagnoses     Alopecia    -  1       Follow-ups after your visit        Your next 10 appointments already scheduled     Nov 27, 2018  8:20 AM CST   Return Visit with Stalin Gibson MD   [facility] ([facility])    [address]  Mail [address]  [facility] [address]   [phone]              Who to contact     If you have questions or need follow up information about today's clinic visit or your schedule please contact [facility] directly at [phone].  Normal or non-critical lab and imaging results will be communicated to you by MyChart, letter or phone within 4 business days after the clinic has received the results. If you do not hear from us within 7 days, please contact the clinic through MyChart or phone. If you have a critical or abnormal lab result, we will notify you by phone as soon as possible.  Submit refill requests through [employer] or call your pharmacy and they will forward the refill request to us. Please allow 3 business days for your refill to be completed.          Additional Information About Your Visit        MyChart Information     [employer] gives you secure access to your electronic health record. If you see a primary care provider, you can also send messages to your care team and make appointments. If you have questions, please call your primary care clinic.  If you do not have a primary care provider, please call [phone] and they will assist you.        Care EveryWhere ID     This is your Care EveryWhere ID. This could be used by other organizations to access your [place] medical records  [id number]        Your Vitals Were      Pulse Pulse Oximetry                71 97%           Blood Pressure from Last 3 Encounters:   11/23/18 126/66   09/14/18 102/64   01/26/18 100/60    Weight from Last 3 Encounters:   09/14/18 70.2 kg (154 lb 12.8 oz)   01/19/18 69.9 kg (154 lb 1.6 oz)   09/08/17 69.4 kg (153 lb)              We Performed the Following     Anti Nuclear Janine IgG by IFA with Reflex     CBC with platelets     DHEA sulfate     Ferritin     Iron     TSH with free T4 reflex     Vitamin B1 whole blood     Vitamin D Deficiency     Zinc          Today's Medication Changes          These changes are accurate as of 11/23/18 12:24 PM.  If you have any questions, ask your nurse or doctor.               Start taking these medicines.        Dose/Directions    finasteride 1 MG tablet   Commonly known as:  PROPECIA   Used for:  Alopecia   Started by:  Sonja Fortune PA-C        Take 1 tab PO daily   Quantity:  90 tablet   Refills:  3            Where to get your medicines      These medications were sent to [facility]/pharmacy #[id number] [facility] [address], [place] [address]     Phone:  [phone]     finasteride 1 MG tablet                Primary Care Provider Office Phone # Fax #    Rubia Sandoval [phone]       [facility] [address] [place] [address]        Equal Access to Services     [facility]: Hadii aad ku hadasho Soomaali, waaxda luqadaha, qaybta kaalmada adeegyada, mary jenkins haytomeka bennett . So [facility] [phone].    ATENCIÓN: Si habla español, tiene a dodge disposición servicios gratuitos de asistencia lingüística. Llame al [phone].    We comply with applicable federal civil rights laws and [place] laws. We do not discriminate on the basis of race, color, national origin, age, disability, sex, sexual orientation, or gender identity.            Thank you!     Thank you for choosing [facility]  for your care. Our goal is always to  "provide you with excellent care. Hearing back from our patients is one way we can continue to improve our services. Please take a few minutes to complete the written survey that you may receive in the mail after your visit with us. Thank you!             Your Updated Medication List - Protect others around you: Learn how to safely use, store and throw away your medicines at www.disposemymeds.org.          This list is accurate as of 11/23/18 12:24 PM.  Always use your most recent med list.                   Brand Name Dispense Instructions for use Diagnosis    azithromycin 250 MG tablet    ZITHROMAX    6 tablet    Two tablets first day, then one tablet daily for four days.    Acute non-recurrent maxillary sinusitis       finasteride 1 MG tablet    PROPECIA    90 tablet    Take 1 tab PO daily    Alopecia       sertraline 100 MG tablet    ZOLOFT    90 tablet    Take 1 tablet (100 mg) by mouth daily    Major depressive disorder with single episode, in full remission (H)       Syringe/Needle (Disp) 23G X 1\" 1 ML Misc     50 each    To use with weekly IM injection    Gender dysphoria in adolescent and adult, Female-to-male transgender person       testosterone cypionate 200 MG/ML injection    DEPOTESTOSTERONE    2 mL    Inject 0.3 mLs (60 mg) into the muscle once a week    Gender dysphoria in adolescent and adult, Female-to-male transgender person         "

## 2018-11-23 NOTE — LETTER
11/23/2018         RE: Rohan Wilson  [address] [place] [address]        Dear Colleague,    Thank you for referring your patient, Rohan Wilson, to the [facility]. Please see a copy of my visit note below.    HPI:   Rohan Wilson is a 23 year old male who presents for evaluation of hair loss. Has noticed shedding and thinning; more so over the past 6 months.  chief complaint  Location: top of scalp   Condition present for:  6 months.   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     Review Of Systems  Eyes: negative  Ears/Nose/Throat: negative  Respiratory: No shortness of breath, dyspnea on exertion, cough, or hemoptysis  Cardiovascular: negative  Gastrointestinal: negative  Genitourinary: negative  Musculoskeletal: negative  Neurologic: negative  Psychiatric: negative    This document serves as a record of the services and decisions personally performed and made by Sonja Fortune, MS, PA-C. It was created on her behalf by Kamryn Hogue, a trained medical scribe. The creation of this document is based on the provider's statements to the medical scribe.  Kamryn Hogue 10:45 AM November 23, 2018    PHYSICAL EXAM:    /66  Pulse 71  SpO2 97%  Skin exam performed as follows: Type 2 skin. Mood appropriate  Alert and Oriented X 3. Well developed, well nourished in no distress.  General appearance: Normal  Head including face: Normal  Eyes: conjunctiva and lids: Normal  Mouth: Lips, teeth, gums: Normal  Neck: Normal  Chest-breast/axillae: Normal  Back: Normal  Spleen and liver: Normal  Cardiovascular: Exam of peripheral vascular system by observation for swelling, varicosities, edema: Normal  Genitalia: groin, buttocks: Normal  Extremities: digits/nails (clubbing): Normal  Eccrine and Apocrine glands: Normal  Right upper  extremity: Normal  Left upper extremity: Normal  Right lower extremity: Normal  Left lower extremity: Normal  Skin: Scalp and body hair: See below    1. Decreased density through vertex and temporal scalp. Scalp normal without erythema or scaling. Negative hair pull.     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  Advised on natural course and progression secondary to hormones and genetics. Discussed starting Rogaine every day-BID vs Propecia. After discussion he would like to start with Propecia  --Start Propecia 1 tab PO daily    --Check labs today    Follow-up: yearly if doing well/PRN if struggling  CC:   Scribed By: Kamryn Hogue, Medical Scribe    The information in this document, created by the medical scribe for me, accurately reflects the services I personally performed and the decisions made by me. I have reviewed and approved this document for accuracy prior to leaving the patient care area.  November 23, 2018 10:52 AM    Sonja Fortune MS, PAMARCIA      Again, thank you for allowing me to participate in the care of your patient.        Sincerely,        Sonja Fortune PA-C

## 2018-11-26 LAB
ANA SER QL IF: NEGATIVE
DHEA-S SERPL-MCNC: 277 UG/DL (ref 80–560)
VIT B1 BLD-MCNC: 155 NMOL/L (ref 70–180)

## 2018-11-27 ENCOUNTER — OFFICE VISIT (OUTPATIENT)
Dept: OTHER | Facility: OUTPATIENT CENTER | Age: 23
End: 2018-11-27
Payer: COMMERCIAL

## 2018-11-27 VITALS
HEART RATE: 74 BPM | DIASTOLIC BLOOD PRESSURE: 72 MMHG | SYSTOLIC BLOOD PRESSURE: 124 MMHG | WEIGHT: 157.8 LBS | BODY MASS INDEX: 23.3 KG/M2

## 2018-11-27 DIAGNOSIS — Z78.9 FEMALE-TO-MALE TRANSGENDER PERSON: ICD-10-CM

## 2018-11-27 DIAGNOSIS — F64.0 GENDER DYSPHORIA IN ADOLESCENT AND ADULT: ICD-10-CM

## 2018-11-27 LAB — ZINC SERPL-MCNC: 63 UG/DL (ref 60–120)

## 2018-11-27 RX ORDER — TESTOSTERONE CYPIONATE 200 MG/ML
60 INJECTION, SOLUTION INTRAMUSCULAR WEEKLY
Qty: 4 ML | Refills: 5 | Status: SHIPPED | OUTPATIENT
Start: 2018-11-27 | End: 2019-04-25

## 2018-11-27 ASSESSMENT — ENCOUNTER SYMPTOMS
NUMBNESS: 0
FEVER: 0
LIGHT-HEADEDNESS: 0
SHORTNESS OF BREATH: 0
ABDOMINAL PAIN: 0
CHEST TIGHTNESS: 0
VOMITING: 0
WEAKNESS: 0
DYSPHORIC MOOD: 0
FREQUENCY: 0
UNEXPECTED WEIGHT CHANGE: 0
POLYDIPSIA: 0
HEADACHES: 0
NERVOUS/ANXIOUS: 0
PALPITATIONS: 0
CHILLS: 0

## 2018-11-27 NOTE — PROGRESS NOTES
"       MAGDALENE Madrigal is a 23 year old individual that uses pronouns He/Him/His/Himself that presents today for follow up of:  masculinizing hormone therapy.   Gender identity: male.   Started Hormone  therapy  *11/2015  Continues on Depo Testosterone 60 mg IM weekly    Any special concerns today?    Did not have labs done before visit, but will have lab appt. Set up  No new concerns    On hormones?  YES +++ Shot day of the week? Tuesday      Due for labs?  Yes      +++ Refills of meds needed?  Yes  Gender related body changes since last visit:   No new changes, starting a little receding hairline.  More facial hair    Breakthrough bleeding? No:   Activity: work out 6x/wk, cardio and strength  New health concerns since last visit:  none---    Past Surgical History:   Procedure Laterality Date     ESOPHAGOSCOPY, GASTROSCOPY, DUODENOSCOPY (EGD), COMBINED  2/9/2014    Procedure: COMBINED ESOPHAGOSCOPY, GASTROSCOPY, DUODENOSCOPY (EGD), BIOPSY SINGLE OR MULTIPLE;  Esophagoscopy,Gastroscopy,Duodenoscopy- room 240 with stomach biopsies via cold forceps.;  Surgeon: Carmelo Tidwell MD;  Location:  GI     TRANSGENDER MASTECTOMY Bilateral 7/25/2016    Procedure: TRANSGENDER MASTECTOMY;  Surgeon: Rosario Resendiz MD;  Location: [facility] OR       Patient Active Problem List   Diagnosis     Major depressive disorder, recurrent episode, in partial remission (H)     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 6 tablet 0     finasteride (PROPECIA) 1 MG tablet Take 1 tab PO daily 90 tablet 3     sertraline (ZOLOFT) 100 MG tablet Take 1 tablet (100 mg) by mouth daily 90 tablet 3     Syringe/Needle, Disp, 23G X 1\" 1 ML MISC To use with weekly IM injection 50 each 3     testosterone cypionate (DEPOTESTOSTERONE) 200 " MG/ML injection Inject 0.3 mLs (60 mg) into the muscle once a week 2 mL 0       History   Smoking Status     Never Smoker   Smokeless Tobacco     Never Used     Comment: no smokers in the home          Allergies   Allergen Reactions     Amitriptyline-Perphenazine [Perphenazine-Amitriptyline] GI Disturbance     Pt developed an ulcer after taking med       Health Maintenance Due   Topic Date Due     Mental Health Tx Plan Q2 MOS  01/11/2018         Problem, Medication and Allergy Lists were reviewed and are current..         Review of Systems:   Review of Systems   Constitutional: Negative for chills, fever and unexpected weight change.   Eyes: Negative for visual disturbance.   Respiratory: Negative for chest tightness and shortness of breath.    Cardiovascular: Negative for chest pain, palpitations and leg swelling.   Gastrointestinal: Negative for abdominal pain and vomiting.   Endocrine: Negative for polydipsia and polyuria.   Genitourinary: Negative for frequency.   Neurological: Negative for weakness, light-headedness, numbness and headaches.   Psychiatric/Behavioral: Negative for dysphoric mood and suicidal ideas. The patient is not nervous/anxious.             Physical Exam:   Blood pressure 124/72, pulse 74, weight 71.6 kg (157 lb 12.8 oz).    Body mass index is 23.3 kg/(m^2).    BMI= There is no height or weight on file to calculate BMI.   Wt Readings from Last 10 Encounters:   09/14/18 70.2 kg (154 lb 12.8 oz)   06/05/18 68.5 kg (151 lb)   01/19/18 69.9 kg (154 lb 1.6 oz)   12/07/17 70.8 kg (156 lb)   09/08/17 69.4 kg (153 lb)   07/06/17 69.4 kg (153 lb)   01/23/17 75.1 kg (165 lb 9.6 oz)   01/20/17 73.5 kg (162 lb)   11/03/16 76.8 kg (169 lb 6.4 oz)   08/15/16 72.6 kg (160 lb)     Appearance: Male appearance and dress    GENERAL:: healthy, alert and no distress  RESP: lungs clear to auscultation - no rales, no rhonchi, no wheezes  CV: regular rates and rhythm, normal S1 S2, no S3 or S4 and no murmur, no  click or rub -  Voice, skin/ Breast, hips per flowsheet  Receding hairline at temples  No acne  Affect: Appropriate/mood-congruent           Labs:   Results from last visit:  Office Visit on 11/23/2018   Component Date Value Ref Range Status     AGUS interpretation 11/23/2018 Negative  NEG^Negative Final    Comment:                                    Reference range:  <1:40  NEGATIVE  1:40 - 1:80  BORDERLINE POSITIVE  >1:80 POSITIVE       WBC 11/23/2018 6.8  4.0 - 11.0 10e9/L Final     RBC Count 11/23/2018 4.88  4.4 - 5.9 10e12/L Final     Hemoglobin 11/23/2018 16.1  13.3 - 17.7 g/dL Final     Hematocrit 11/23/2018 49.0  40.0 - 53.0 % Final     MCV 11/23/2018 100  78 - 100 fl Final     MCH 11/23/2018 33.0  26.5 - 33.0 pg Final     MCHC 11/23/2018 32.9  31.5 - 36.5 g/dL Final     RDW 11/23/2018 12.4  10.0 - 15.0 % Final     Platelet Count 11/23/2018 209  150 - 450 10e9/L Final     DHEA Sulfate 11/23/2018 277  80 - 560 ug/dL Final     Ferritin 11/23/2018 29  26 - 388 ng/mL Final     Iron 11/23/2018 140  35 - 180 ug/dL Final     TSH 11/23/2018 1.76  0.40 - 4.00 mU/L Final     Vitamin B1 Whole Blood Level 11/23/2018 155  70 - 180 nmol/L Final    Comment: (Note)  INTERPRETIVE INFORMATION: Vitamin B1, Whole Blood  This assay measures the concentration of thiamine   diphosphate (TDP), the primary active form of vitamin B1.   Approximately 90 percent of vitamin B1 present in whole   blood is TDP. Thiamine and thiamine monophosphate, which   comprise the remaining 10 percent, are not measured.  Test developed and characteristics determined by [facility]. See Compliance Statement B: [email or web address]  Performed by [facility],  [address] [place] [address] [phone]  [email or web address], John Taylor MD, Lab. Director       Vitamin D Deficiency screening 11/23/2018 30  20 - 75 ug/L Final    Comment: Season, race, dietary intake, and treatment affect the concentration of   25-hydroxy-Vitamin D. Values may decrease  during winter months and increase   during summer months. Values 20-29 ug/L may indicate Vitamin D insufficiency   and values <20 ug/L may indicate Vitamin D deficiency.  Vitamin D determination is routinely performed by an immunoassay specific for   25 hydroxyvitamin D3.  If an individual is on vitamin D2 (ergocalciferol)   supplementation, please specify 25 OH vitamin D2 and D3 level determination by   LCMSMS test VITD23.           Assessment and Plan   1. Gender dysphoria  Stable masculinization on current hormone dose; pt is Starting propecia for hair loss  To do previously ordered labs.  Reduce dose if at or above upper limit normal      Not currently sexually active  Contraception:   not needed  Health Maintenance:  Pap Smear Report(most recent only)       When: still due  HPV vaccinated  Reviewed guidelines for cervical cancer screening and recommended Pap testing, at minimum HPV swab with PCP or here.    Follow up:  Follow up in 6 months.  Results by mychart  Questions were elicited and answered.     Stalin Gibson MD

## 2018-11-27 NOTE — MR AVS SNAPSHOT
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      Who to contact     Please call your clinic at [phone] to:    Ask questions about your health    Make or cancel appointments    Discuss your medicines    Learn about your test results    Speak to your doctor            Additional Information About Your Visit        MyChart Information     [employer] gives you secure access to your electronic health record. If you see a primary care provider, you can also send messages to your care team and make appointments. If you have questions, please call your primary care clinic.  If you do not have a primary care provider, please call [phone] and they will assist you.      [employer] is an electronic gateway that provides easy, online access to your medical records. With [employer], you can request a clinic appointment, read your test results, renew a prescription or communicate with your care team.     To access your existing account, please contact your [facility] or call [phone] for assistance.        Care EveryWhere ID     This is your Care EveryWhere ID. This could be used by other organizations to access your [place] medical records  [id number]        Your Vitals Were     Pulse BMI (Body Mass Index)                74 23.3 kg/m2           Blood Pressure from Last 3 Encounters:   11/27/18 124/72   11/23/18 126/66   09/14/18 102/64    Weight from Last 3 Encounters:   11/27/18 71.6 kg (157 lb 12.8 oz)   09/14/18 70.2 kg (154  lb 12.8 oz)   06/05/18 68.5 kg (151 lb)              Today, you had the following     No orders found for display         Where to get your medicines      Some of these will need a paper prescription and others can be bought over the counter.  Ask your nurse if you have questions.     Bring a paper prescription for each of these medications     testosterone cypionate 200 MG/ML injection          Primary Care Provider Office Phone # Fax #    Rubia Sandoval [phone]       [address] [facility] [address]  [facility] [address]        Equal Access to Services     [facility]: Hadii hortensia schreiber hadasho Soomaali, waaxda luqadaha, qaybta kaalmada adeegyagentry, mary bennett . So [facility] [phone].    ATENCIÓN: Si habla español, tiene a dodge disposición servicios gratuitos de asistencia lingüística. Manan al [phone].    We comply with applicable federal civil rights laws and [place] laws. We do not discriminate on the basis of race, color, national origin, age, disability, sex, sexual orientation, or gender identity.            Thank you!     Thank you for choosing [facility]  for your care. Our goal is always to provide you with excellent care. Hearing back from our patients is one way we can continue to improve our services. Please take a few minutes to complete the written survey that you may receive in the mail after your visit with us. Thank you!             Your Updated Medication List - Protect others around you: Learn how to safely use, store and throw away your medicines at www.disposemymeds.org.          This list is accurate as of 11/27/18 11:59 PM.  Always use your most recent med list.                   Brand Name Dispense Instructions for use Diagnosis    finasteride 1 MG tablet    PROPECIA    90 tablet    Take 1 tab PO daily    Alopecia       sertraline 100 MG tablet    ZOLOFT    90 tablet    Take 1 tablet (100 mg) by mouth daily    Major depressive disorder  "with single episode, in full remission (H)       Syringe/Needle (Disp) 23G X 1\" 1 ML Misc     50 each    To use with weekly IM injection    Gender dysphoria in adolescent and adult, Female-to-male transgender person       testosterone cypionate 200 MG/ML injection    DEPOTESTOSTERONE    4 mL    Inject 0.3 mLs (60 mg) into the muscle once a week    Gender dysphoria in adolescent and adult, Female-to-male transgender person         "

## 2018-12-20 ENCOUNTER — TELEPHONE (OUTPATIENT)
Dept: OTHER | Facility: OUTPATIENT CENTER | Age: 23
End: 2018-12-20

## 2018-12-20 NOTE — TELEPHONE ENCOUNTER
Call to pharmacy with verbal updated on  patients Testosterone Rx as approved on 11/27/2018.      Marlen Rodriguez,CMA

## 2019-01-29 ENCOUNTER — OFFICE VISIT (OUTPATIENT)
Dept: URGENT CARE | Facility: URGENT CARE | Age: 24
End: 2019-01-29
Payer: COMMERCIAL

## 2019-01-29 VITALS
WEIGHT: 153 LBS | TEMPERATURE: 98.5 F | BODY MASS INDEX: 22.59 KG/M2 | RESPIRATION RATE: 20 BRPM | HEART RATE: 70 BPM | DIASTOLIC BLOOD PRESSURE: 64 MMHG | OXYGEN SATURATION: 96 % | SYSTOLIC BLOOD PRESSURE: 110 MMHG

## 2019-01-29 DIAGNOSIS — R05.9 COUGH: Primary | ICD-10-CM

## 2019-01-29 LAB
DEPRECATED S PYO AG THROAT QL EIA: NORMAL
SPECIMEN SOURCE: NORMAL

## 2019-01-29 PROCEDURE — 87880 STREP A ASSAY W/OPTIC: CPT | Performed by: PHYSICIAN ASSISTANT

## 2019-01-29 PROCEDURE — 87081 CULTURE SCREEN ONLY: CPT | Performed by: PHYSICIAN ASSISTANT

## 2019-01-29 PROCEDURE — 99213 OFFICE O/P EST LOW 20 MIN: CPT | Performed by: PHYSICIAN ASSISTANT

## 2019-01-29 NOTE — PROGRESS NOTES
"SUBJECTIVE:  Rohan Wilson is a 23 year old male with a chief complaint of sore throat, cough, facial pressure, headache.  Onset of symptoms was 1 week(s) ago.    Course of illness: gradual onset.  Severity moderate  Current and Associated symptoms: as above  Treatment measures tried include afrin.  Predisposing factors include None.    No flu shot    Aleve at 10 am.          Past Medical History:   Diagnosis Date     Congenital dislocation of hip, unilateral birth    Congenital hip dislocation     Varicella without mention of complication     varicella (6 lesions-age 6 weeks)     Volume depletion     dehydration with C. Diff-1998, dehydration with Rotovirus-2000     Current Outpatient Medications   Medication Sig Dispense Refill     finasteride (PROPECIA) 1 MG tablet Take 1 tab PO daily 90 tablet 3     sertraline (ZOLOFT) 100 MG tablet Take 1 tablet (100 mg) by mouth daily 90 tablet 3     Syringe/Needle, Disp, 23G X 1\" 1 ML MISC To use with weekly IM injection 50 each 3     testosterone cypionate (DEPOTESTOSTERONE) 200 MG/ML injection Inject 0.3 mLs (60 mg) into the muscle once a week 4 mL 5     Social History     Tobacco Use     Smoking status: Never Smoker     Smokeless tobacco: Never Used     Tobacco comment: no smokers in the home   Substance Use Topics     Alcohol use: No     Alcohol/week: 0.0 oz       ROS:  Review of systems negative except as stated above.    OBJECTIVE:   /64 (BP Location: Right arm, Patient Position: Sitting, Cuff Size: Adult Regular)   Pulse 70   Temp 98.5  F (36.9  C)   Resp 20   Wt 69.4 kg (153 lb)   SpO2 96%   BMI 22.59 kg/m    GENERAL APPEARANCE: healthy, alert and no distress  EYES: conjunctiva clear  HENT: ear canals and TM's normal.  Nose normal.  Pharynx erythematous with some cobblestoning noted.  NECK: supple, non-tender to palpation, no adenopathy noted  RESP: lungs clear to auscultation - no rales, rhonchi or wheezes  CV: regular rates and rhythm, normal S1 S2, " no murmur noted  SKIN: no suspicious lesions or rashes    Results for orders placed or performed in visit on 01/29/19   Rapid strep screen   Result Value Ref Range    Specimen Description Throat     Rapid Strep A Screen       NEGATIVE: No Group A streptococcal antigen detected by immunoassay, await culture report.         ASSESSMENT:  (R05) Cough  (primary encounter diagnosis)  Comment: likely due to URI  Plan: Rapid strep screen, Beta strep group A culture  Patient Instructions         With distilled water 2-3x per day for a minimum 2-3 days  Mucinex, increased fluids, humidifier at night, steaming in the day, warm compresses on face  Cough drops and hot saltwater gargles as needed    Adult Self-Care for Colds  Colds are caused by viruses. They can't be cured with antibiotics. However, you can ease symptoms and support your body's efforts to heal itself.  No matter which symptoms you have, be sure to:    Drink plenty of fluids (water or clear soup)    Stop smoking and drinking alcohol    Get plenty of rest    Understand a fever    Take your temperature several times a day. If your fever is 100.4 F (38.0 C) for more than a day, call your healthcare provider.    Relax, lie down. Go to bed if you want. Just get off your feet and rest. Also, drink plenty of fluids to avoid dehydration.    Take acetaminophen or a nonsteroidal anti-inflammatory agent (NSAID), such as ibuprofen.  Treat a troubled nose kindly    Breathe steam or heated humidified air to open blocked nasal passages.  a hot shower or use a vaporizer. Be careful not to get burned by the steam.    Saline nasal sprays and decongestant tablets help open a stuffy nose. Antihistamines can also help, but they can cause side effects such as drowsiness and drying of the eyes, nose, and mouth.  Soothe a sore throat and cough    Gargle every 2 hours with 1/4 teaspoon of salt dissolved in 1/2 cup of warm water. Suck on throat lozenges and cough drops to moisten  your throat.    Cough medicines are available but it is unclear how well they actually work.    Take acetaminophen or an NSAID, such as ibuprofen, to ease throat pain  Ease digestive problems    Put fluids back into your body. Take frequent sips of clear liquids such as water or broth. Avoid drinks that have a lot of sugar in them, such as juices and sodas. These can make diarrhea worse. Older children and adults can drink sports drinks.    As your appetite returns, you can resume your normal diet. Ask your healthcare provider if there are any foods you should avoid.  When to seek medical care  When you first notice symptoms, ask your healthcare provider if antiviral medicines are appropriate. Antibiotics should not be taken for colds or flu. Also, call your healthcare provider if you have any of the following symptoms or if you aren't feeling better after 7 days:    Shortness of breath    Pain or pressure in the chest or belly (abdomen)    Worsening symptoms, especially after a period of improvement    Fever of 100.4 F  (38.0 C) or higher, or fever that doesn't go down with medicine    Sudden dizziness or confusion    Severe or continued vomiting    Signs of dehydration, including extreme thirst, dark urine, infrequent urination, dry mouth    Spotted, red, or very sore throat   Date Last Reviewed: 12/1/2016 2000-2017 [employer]. [address] [place] [address], [place] [address]. All rights reserved. This information is not intended as a substitute for professional medical care. Always follow your healthcare professional's instructions.

## 2019-01-29 NOTE — PATIENT INSTRUCTIONS
With distilled water 2-3x per day for a minimum 2-3 days  Mucinex, increased fluids, humidifier at night, steaming in the day, warm compresses on face  Cough drops and hot saltwater gargles as needed    Adult Self-Care for Colds  Colds are caused by viruses. They can't be cured with antibiotics. However, you can ease symptoms and support your body's efforts to heal itself.  No matter which symptoms you have, be sure to:    Drink plenty of fluids (water or clear soup)    Stop smoking and drinking alcohol    Get plenty of rest    Understand a fever    Take your temperature several times a day. If your fever is 100.4 F (38.0 C) for more than a day, call your healthcare provider.    Relax, lie down. Go to bed if you want. Just get off your feet and rest. Also, drink plenty of fluids to avoid dehydration.    Take acetaminophen or a nonsteroidal anti-inflammatory agent (NSAID), such as ibuprofen.  Treat a troubled nose kindly    Breathe steam or heated humidified air to open blocked nasal passages.  a hot shower or use a vaporizer. Be careful not to get burned by the steam.    Saline nasal sprays and decongestant tablets help open a stuffy nose. Antihistamines can also help, but they can cause side effects such as drowsiness and drying of the eyes, nose, and mouth.  Soothe a sore throat and cough    Gargle every 2 hours with 1/4 teaspoon of salt dissolved in 1/2 cup of warm water. Suck on throat lozenges and cough drops to moisten your throat.    Cough medicines are available but it is unclear how well they actually work.    Take acetaminophen or an NSAID, such as ibuprofen, to ease throat pain  Ease digestive problems    Put fluids back into your body. Take frequent sips of clear liquids such as water or broth. Avoid drinks that have a lot of sugar in them, such as juices and sodas. These can make diarrhea worse. Older children and adults can drink sports drinks.    As your appetite returns, you can resume  your normal diet. Ask your healthcare provider if there are any foods you should avoid.  When to seek medical care  When you first notice symptoms, ask your healthcare provider if antiviral medicines are appropriate. Antibiotics should not be taken for colds or flu. Also, call your healthcare provider if you have any of the following symptoms or if you aren't feeling better after 7 days:    Shortness of breath    Pain or pressure in the chest or belly (abdomen)    Worsening symptoms, especially after a period of improvement    Fever of 100.4 F  (38.0 C) or higher, or fever that doesn't go down with medicine    Sudden dizziness or confusion    Severe or continued vomiting    Signs of dehydration, including extreme thirst, dark urine, infrequent urination, dry mouth    Spotted, red, or very sore throat   Date Last Reviewed: 12/1/2016 2000-2017 [employer]. [address] [place] [address]. All rights reserved. This information is not intended as a substitute for professional medical care. Always follow your healthcare professional's instructions.

## 2019-01-30 LAB
BACTERIA SPEC CULT: NORMAL
SPECIMEN SOURCE: NORMAL

## 2019-03-05 DIAGNOSIS — Z01.89 LABORATORY TEST: Primary | ICD-10-CM

## 2019-03-05 DIAGNOSIS — Z78.9 FEMALE-TO-MALE TRANSGENDER PERSON: ICD-10-CM

## 2019-03-05 DIAGNOSIS — F64.0 GENDER DYSPHORIA IN ADOLESCENT AND ADULT: ICD-10-CM

## 2019-03-05 LAB
ALT SERPL W P-5'-P-CCNC: 19 U/L (ref 0–70)
AST SERPL W P-5'-P-CCNC: 24 U/L (ref 0–45)
CHOLEST SERPL-MCNC: 146 MG/DL
GLUCOSE SERPL-MCNC: 53 MG/DL (ref 70–99)
HDLC SERPL-MCNC: 58 MG/DL
HGB BLD-MCNC: 15.4 G/DL (ref 13.3–17.7)
LDLC SERPL CALC-MCNC: 73 MG/DL
NONHDLC SERPL-MCNC: 88 MG/DL
TRIGL SERPL-MCNC: 74 MG/DL

## 2019-03-05 PROCEDURE — 84450 TRANSFERASE (AST) (SGOT): CPT | Performed by: FAMILY MEDICINE

## 2019-03-05 PROCEDURE — 85018 HEMOGLOBIN: CPT | Performed by: FAMILY MEDICINE

## 2019-03-05 PROCEDURE — 82947 ASSAY GLUCOSE BLOOD QUANT: CPT | Performed by: FAMILY MEDICINE

## 2019-03-05 PROCEDURE — 84270 ASSAY OF SEX HORMONE GLOBUL: CPT | Performed by: FAMILY MEDICINE

## 2019-03-05 PROCEDURE — 84460 ALANINE AMINO (ALT) (SGPT): CPT | Performed by: FAMILY MEDICINE

## 2019-03-05 PROCEDURE — 84403 ASSAY OF TOTAL TESTOSTERONE: CPT | Performed by: FAMILY MEDICINE

## 2019-03-05 PROCEDURE — 80061 LIPID PANEL: CPT | Performed by: FAMILY MEDICINE

## 2019-03-05 NOTE — PROGRESS NOTES
Performed venipuncture on Dr. Gibson's patient today.    Lab specimen will get send to [place].    Yennifer Velazquez CMA  March 5, 2019 2:37 PM

## 2019-03-07 LAB
SHBG SERPL-SCNC: 34 NMOL/L (ref 11–80)
TESTOST FREE SERPL-MCNC: 13.85 NG/DL (ref 4.7–24.4)
TESTOST SERPL-MCNC: 643 NG/DL (ref 240–950)

## 2019-03-15 NOTE — RESULT ENCOUNTER NOTE
Dear Rohan,   Here are your recent results which are within the expected range. Please continue with your current plan of care.       Stalin Gibson MD

## 2019-04-02 ENCOUNTER — TELEPHONE (OUTPATIENT)
Dept: OTHER | Facility: OUTPATIENT CENTER | Age: 24
End: 2019-04-02

## 2019-04-02 NOTE — TELEPHONE ENCOUNTER
Call from Rohan's mom Dea with concerns over Rohan's continued break through bleeding.    Message left that an appointment was needed as well as labs.    Are there additional labs you may want to order prior to seeing them.    Please advise      Marlen Rodriguez CMA

## 2019-04-03 NOTE — TELEPHONE ENCOUNTER
4/3/2019    Mom Dea leaves message again asking if Rohan should stop the finasteride prescribed by outside provider [facility] White. I stated that it was their chose to continue or not. No harm to stopping. Asked that they call and make their  follow up appointment for May. To discuss the continuation of the Finasteride at that time.    Marlen Rodriguez,CMA

## 2019-04-25 ENCOUNTER — OFFICE VISIT (OUTPATIENT)
Dept: OTHER | Facility: OUTPATIENT CENTER | Age: 24
End: 2019-04-25
Payer: COMMERCIAL

## 2019-04-25 VITALS
BODY MASS INDEX: 23.11 KG/M2 | HEIGHT: 69 IN | WEIGHT: 156 LBS | DIASTOLIC BLOOD PRESSURE: 62 MMHG | HEART RATE: 61 BPM | SYSTOLIC BLOOD PRESSURE: 127 MMHG

## 2019-04-25 DIAGNOSIS — F64.0 GENDER DYSPHORIA IN ADOLESCENT AND ADULT: ICD-10-CM

## 2019-04-25 DIAGNOSIS — Z78.9 FEMALE-TO-MALE TRANSGENDER PERSON: ICD-10-CM

## 2019-04-25 DIAGNOSIS — N93.8 DYSFUNCTIONAL UTERINE BLEEDING: Primary | ICD-10-CM

## 2019-04-25 RX ORDER — TESTOSTERONE CYPIONATE 200 MG/ML
60 INJECTION, SOLUTION INTRAMUSCULAR WEEKLY
Qty: 4 ML | Refills: 5 | Status: SHIPPED | OUTPATIENT
Start: 2019-04-25 | End: 2019-09-04

## 2019-04-25 ASSESSMENT — ENCOUNTER SYMPTOMS
FEVER: 0
HEADACHES: 0
CHEST TIGHTNESS: 0
PALPITATIONS: 0
WEAKNESS: 0
NERVOUS/ANXIOUS: 0
FREQUENCY: 0
SHORTNESS OF BREATH: 0
VOMITING: 0
DYSPHORIC MOOD: 0
LIGHT-HEADEDNESS: 0
NUMBNESS: 0
ABDOMINAL PAIN: 0
UNEXPECTED WEIGHT CHANGE: 0
CHILLS: 0
POLYDIPSIA: 0

## 2019-04-25 ASSESSMENT — MIFFLIN-ST. JEOR: SCORE: 1692.99

## 2019-04-25 NOTE — NURSING NOTE
"Chief Complaint   Patient presents with     RECHECK     TG       Vitals:    04/25/19 1442   BP: 127/62   Pulse: 61   Weight: 70.8 kg (156 lb)   Height: 1.753 m (5' 9\")       Body mass index is 23.04 kg/m .      Marlen Rodriguez CMA    "

## 2019-04-25 NOTE — PROGRESS NOTES
MAGDALENE Madrigal is a 24 year old individual that uses pronouns He/Him/His/Himself that presents today for follow up of:  masculinizing hormone therapy.   Alone or accompanied by: accompanied today by self  Gender identity: male  Started Hormone  therapy  2015  Continues on Depo Testosterone 60 mg IM weekly  .  Any special concerns today?    Since 11-12/2018, began having breakthrough bleeding and then 2-3 months ago become regular periods.  Progressively heavier flow as well. No hot flashes or night sweats. + cramps  Not sexually active with partner  Started finasteride for hair in 11/2018  Total testosterone 643 and free testosterone 13. 8  +bloating, no major weight changes.   TSH 11/2018 normal   Stopped finasteride 3 weeks ago, and then changed mind and went to 0.5 mg dose.   Not missing or delaying doses of hormones      On hormones?  YES +++ Shot day of the week? Friday      Due for labs?  Yes      +++ Refills of meds needed?  Yes  Gender related body changes since last visit:   none    Breakthrough bleeding? Yes--see above  Activity: work 6x/wk strength and cardio  New health concerns since last visit:  none  ---    Past Surgical History:   Procedure Laterality Date     ESOPHAGOSCOPY, GASTROSCOPY, DUODENOSCOPY (EGD), COMBINED  2/9/2014    Procedure: COMBINED ESOPHAGOSCOPY, GASTROSCOPY, DUODENOSCOPY (EGD), BIOPSY SINGLE OR MULTIPLE;  Esophagoscopy,Gastroscopy,Duodenoscopy- room 240 with stomach biopsies via cold forceps.;  Surgeon: Carmelo Tidwell MD;  Location:  GI     TRANSGENDER MASTECTOMY Bilateral 7/25/2016    Procedure: TRANSGENDER MASTECTOMY;  Surgeon: Rosario Resendiz MD;  Location:  OR       Patient Active Problem List   Diagnosis     Major depressive disorder, recurrent episode, in partial remission (H)     Female-to-male transgender person     CARDIOVASCULAR SCREENING; LDL GOAL LESS THAN 130       Current Outpatient Medications   Medication Sig Dispense Refill     finasteride  "(PROPECIA) 1 MG tablet Take 1 tab PO daily 90 tablet 3     sertraline (ZOLOFT) 100 MG tablet Take 1 tablet (100 mg) by mouth daily 90 tablet 3     Syringe/Needle, Disp, 23G X 1\" 1 ML MISC To use with weekly IM injection 50 each 3     testosterone cypionate (DEPOTESTOSTERONE) 200 MG/ML injection Inject 0.3 mLs (60 mg) into the muscle once a week 4 mL 5       History   Smoking Status     Never Smoker   Smokeless Tobacco     Never Used     Comment: no smokers in the home          Allergies   Allergen Reactions     Amitriptyline-Perphenazine [Perphenazine-Amitriptyline] GI Disturbance     Pt developed an ulcer after taking med       Health Maintenance Due   Topic Date Due     Mental Health Tx Plan Q2 MOS  01/11/2018         Problem, Medication and Allergy Lists were reviewed and are current..         Review of Systems:   Review of Systems   Constitutional: Negative for chills, fever and unexpected weight change.   Eyes: Negative for visual disturbance.   Respiratory: Negative for chest tightness and shortness of breath.    Cardiovascular: Negative for chest pain, palpitations and leg swelling.   Gastrointestinal: Negative for abdominal pain and vomiting.   Endocrine: Negative for polydipsia and polyuria.   Genitourinary: Negative for frequency.   Neurological: Negative for weakness, light-headedness, numbness and headaches.   Psychiatric/Behavioral: Negative for dysphoric mood and suicidal ideas. The patient is not nervous/anxious.             Physical Exam:     Vitals:    04/25/19 1442   BP: 127/62   Pulse: 61   Weight: 70.8 kg (156 lb)   Height: 1.753 m (5' 9\")     BMI= Body mass index is 23.04 kg/m .   Wt Readings from Last 10 Encounters:   04/25/19 70.8 kg (156 lb)   01/29/19 69.4 kg (153 lb)   11/27/18 71.6 kg (157 lb 12.8 oz)   09/14/18 70.2 kg (154 lb 12.8 oz)   06/05/18 68.5 kg (151 lb)   01/19/18 69.9 kg (154 lb 1.6 oz)   12/07/17 70.8 kg (156 lb)   09/08/17 69.4 kg (153 lb)   07/06/17 69.4 kg (153 lb) "   01/23/17 75.1 kg (165 lb 9.6 oz)     Appearance: Male appearance and dress    GENERAL:: healthy, alert and no distress  RESP: lungs clear to auscultation - no rales, no rhonchi, no wheezes  CV: regular rates and rhythm, normal S1 S2, no S3 or S4 and no murmur, no click or rub -  ABDOMEN: soft, no tenderness, no  hepatosplenomegaly, no masses, normal bowel sounds    Affect: Appropriate/mood-congruent           Labs:   Results from last visit:  Orders Only on 03/05/2019   Component Date Value Ref Range Status     Testosterone Total 03/05/2019 643  240 - 950 ng/dL Final    Comment: This test was developed and its performance characteristics determined by the   [facility],  [facility]. It has   not been cleared or approved by the FDA. The laboratory is regulated under   CLIA as qualified to perform high-complexity testing. This test is used for   clinical purposes. It should not be regarded as investigational or for   research.       Sex Hormone Binding Globulin 03/05/2019 34  11 - 80 nmol/L Final     Free Testosterone Calculated 03/05/2019 13.85  4.7 - 24.4 ng/dL Final     ALT 03/05/2019 19  0 - 70 U/L Final     AST 03/05/2019 24  0 - 45 U/L Final     Cholesterol 03/05/2019 146  <200 mg/dL Final     Triglycerides 03/05/2019 74  <150 mg/dL Final     HDL Cholesterol 03/05/2019 58  >39 mg/dL Final     LDL Cholesterol Calculated 03/05/2019 73  <100 mg/dL Final    Desirable:       <100 mg/dl     Non HDL Cholesterol 03/05/2019 88  <130 mg/dL Final     Glucose 03/05/2019 53* 70 - 99 mg/dL Final     Hemoglobin 03/05/2019 15.4  13.3 - 17.7 g/dL Final       Assessment and Plan   1. Gender dysphoria  2. Dysfunctional uterine bleeding  Discussed options, strong possilbility brekathrough likely due to finasteride  Will get pelvic TV ultrasound, discontinue finasteride  If menses stop and ulrasound normal , recommend minoxidil for hair loss  Can consider progestin if needed       Not  currently sexually active  Contraception:   not needed  Health Maintenance:   Pap Smear Report(most recent only)       When: never?     HPVQuadrivalent 3/31/2011, 10/19/2009, 8/2/2007          Follow up:  Follow-up 6 months, sooner if menses (within 3 months)  Results by mychart  Questions were elicited and answered.     Stalin Gibson MD

## 2019-05-21 ENCOUNTER — OFFICE VISIT (OUTPATIENT)
Dept: DERMATOLOGY | Facility: CLINIC | Age: 24
End: 2019-05-21
Payer: COMMERCIAL

## 2019-05-21 DIAGNOSIS — L65.9 ALOPECIA: ICD-10-CM

## 2019-05-21 RX ORDER — FINASTERIDE 1 MG/1
TABLET, FILM COATED ORAL
Qty: 60 TABLET | Refills: 2 | Status: SHIPPED | OUTPATIENT
Start: 2019-05-21 | End: 2020-03-12

## 2019-05-21 ASSESSMENT — PAIN SCALES - GENERAL: PAINLEVEL: NO PAIN (0)

## 2019-05-21 NOTE — NURSING NOTE
Dermatology Rooming Note    Rohan Wilson's goals for this visit include:   Chief Complaint   Patient presents with     Hair Loss     Hair loss. Rohan notes he was prescribed Propecia and had side effects. Here today to discuss treatment options.     Patricia Vicente, CMA

## 2019-05-21 NOTE — PATIENT INSTRUCTIONS
Continue the Rogaine 2 times per day (or 2 applications at night)  Resume propecia at lower dose - every other day (instead of every day)  Consider reduction in testosterone dose - discuss with your doctor  Consider laser hair comb as below      Photobiomodulation (Low Level Laser (Light) Therapy)      What is Photobiomodulation?     Photobiomodulation, also referred to as low level laser therapy,  is an emerging treatment for hair thinning in men and women, also known as pattern hair loss. The devices use light to stimulate the hair follicle.  The exact mechanism is still unknown but there is evidence for improvement with hair growth and density.      What types of devices are available?    Each patient has many different options devices depending on preference for shape, frequency of use and price point.      There is no study comparing these devices. However, most research available is on the Hairmax devices.    Below is a sample of some devices currently available. All are FDA cleared for androgenetic alopecia.    In general, the more laser lights the more expensive the device. However, this does not necessarily mean the device works better.      Sellf Lasercomb and Band   Shape Comb or Band   Mc Comb ($199-$399), Band ($499-$799)   Contact Information [email or web address]  [phone]  [phone]              LaserCap Pro Capillus 82 iGrow Theradome   Shape Hat Baseball Cap Helmet Helmet   Mc $3,000 $799 $549 $895   Contact Information [employer]  [phone]  [email or web address]  [phone]  [email or web address]  [phone]  [email or web address]   [phone]         If you plan to buy a hair max device, please consider using the following coupon code as this will give you a discount and also result in a donation from hair [employer] to our medical students.     e      Last update:4/2/2018

## 2019-05-21 NOTE — LETTER
5/21/2019       RE: Rohan Wilson  [address] [place] [address]     Dear Colleague,    Thank you for referring your patient, Rohan Wilson, to the [facility] DERMATOLOGY at [facility]. Please see a copy of my visit note below.    [facility] Dermatology Note      Dermatology Problem List:  1. Pattern hair loss  - Tx: Rogaine foam and/or liquid BID, finasteride 1mg QOD    Encounter Date: May 21, 2019    CC:   Chief Complaint   Patient presents with     Hair Loss     Hair loss. Rohan notes he was prescribed Propecia and had side effects. Here today to discuss treatment options.       History of Present Illness:  Mr. Rohan Wilson is a 24 year old male who presents for evaluation of hair loss. Started with Rogaine - using this two times per day since last summer. Wasn't sure if this was helping, maybe noticed some small benefit. Then saw a dermatologist who recommended propecia. Tried Propecia for 6 months which seemed to be helping but had a side effect - return of menses, transgender doctor recommended stopping to see if the side effect would resolve. Since stopping the side effect went away. But is now is starting to have more hair loss. Wondering about other treatment options. No issues with hair loss on other areas of the body. No nail abnormalities. No major life stressors in the last several months.    Past Medical History:   Patient Active Problem List   Diagnosis     Major depressive disorder, recurrent episode, in partial remission (H)     Female-to-male transgender person     CARDIOVASCULAR SCREENING; LDL GOAL LESS THAN 130     Past Medical History:   Diagnosis Date     Congenital dislocation of hip, unilateral birth    Congenital hip dislocation     Varicella without mention of complication     varicella (6 lesions-age 6 weeks)     Volume depletion     dehydration with C. Diff-1998, dehydration with Rotovirus-2000     Past  "Surgical History:   Procedure Laterality Date     ESOPHAGOSCOPY, GASTROSCOPY, DUODENOSCOPY (EGD), COMBINED  2/9/2014    Procedure: COMBINED ESOPHAGOSCOPY, GASTROSCOPY, DUODENOSCOPY (EGD), BIOPSY SINGLE OR MULTIPLE;  Esophagoscopy,Gastroscopy,Duodenoscopy- room 240 with stomach biopsies via cold forceps.;  Surgeon: Carmelo Tidwell MD;  Location: [facility]     TRANSGENDER MASTECTOMY Bilateral 7/25/2016    Procedure: TRANSGENDER MASTECTOMY;  Surgeon: Rosario Resendiz MD;  Location:  OR       Social History:  Patient reports that he has never smoked. He has never used smokeless tobacco. He reports that he does not drink alcohol or use drugs.    Family History:  Family History   Problem Relation Age of Onset     Cancer Maternal Grandfather         breast     Cancer Paternal Grandmother         prostate     Neurologic Disorder Maternal Aunt         migraines     Neurologic Disorder Mother         migraines         Skin Cancer Mother         BCC     Gastrointestinal Disease Maternal Aunt         crohns     Allergies Father         Food allergy       Medications:  Current Outpatient Medications   Medication Sig Dispense Refill     sertraline (ZOLOFT) 100 MG tablet Take 1 tablet (100 mg) by mouth daily 90 tablet 3     Syringe/Needle, Disp, 23G X 1\" 1 ML MISC To use with weekly IM injection 50 each 3     testosterone cypionate (DEPOTESTOSTERONE) 200 MG/ML injection Inject 0.3 mLs (60 mg) into the muscle once a week 4 mL 5     finasteride (PROPECIA) 1 MG tablet Take 1 tab PO daily (Patient not taking: Reported on 5/21/2019) 90 tablet 3        Allergies   Allergen Reactions     Amitriptyline-Perphenazine [Perphenazine-Amitriptyline] GI Disturbance     Pt developed an ulcer after taking med       Review of Systems:  -As per HPI  -Constitutional: Otherwise feeling well today, in usual state of health.  -Skin: As above in HPI. No additional skin concerns.    Physical exam:  Vitals: There were no vitals taken for this " visit.  GEN: This is a well developed, well-nourished male in no acute distress, in a pleasant mood.    SKIN: Focused examination of the face and scalp was performed.  - Pattern hair loss with thinning of the fronto-temporal hair line, currently without thinning of the vertex scalp  - Hair pull test negative  - No background erythema or scale of the scalp  - No other lesions of concern on areas examined.     Impression/Plan:  1. Pattern hair loss: Reviewed that this process is driven by testosterone. Unsure if the dose can be adjusted - a reduced dose may help with the hair loss but we leave this decision to the prescribing physician, Dr. Gibson. For now will recommend a trial of lower dose Propecia - would reduce to every other day dosing which will hopefully avoid the unintended side effect (DUB).     Maximize Rogaine with BID dosing - can do 2 applications at night if preferred    Recommend trial of lower dose finasteride, 1mg every other day     Consider trial of laser light comb, handout provided    Consider lower dose of testosterone in the future - to be discussed with Dr. Gibson      CC Stalin Gibson MD  No address on file on close of this encounter.  Follow-up in 3 months, earlier for new or changing lesions.     Dr. Miller staffed the patient.    Staff Involved:  Resident(Stalin Lopez)/Staff    Stalin Lopez MD  Dermatology Resident, PGY3    Staff Physician Comments:   I saw and evaluated the patient with the resident and I agree with the assessment and plan.  I was present for the examination. I have made edits if needed.    Alejandro Miller MD  Staff Dermatologist and Dermatopathologist  , Department of Dermatology

## 2019-05-21 NOTE — PROGRESS NOTES
[facility] Dermatology Note      Dermatology Problem List:  1. Pattern hair loss  - Tx: Rogaine foam and/or liquid BID, finasteride 1mg QOD    Encounter Date: May 21, 2019    CC:   Chief Complaint   Patient presents with     Hair Loss     Hair loss. Rohan notes he was prescribed Propecia and had side effects. Here today to discuss treatment options.       History of Present Illness:  Mr. Rohan Wilson is a 24 year old male who presents for evaluation of hair loss. Started with Rogaine - using this two times per day since last summer. Wasn't sure if this was helping, maybe noticed some small benefit. Then saw a dermatologist who recommended propecia. Tried Propecia for 6 months which seemed to be helping but had a side effect - return of menses, transgender doctor recommended stopping to see if the side effect would resolve. Since stopping the side effect went away. But is now is starting to have more hair loss. Wondering about other treatment options. No issues with hair loss on other areas of the body. No nail abnormalities. No major life stressors in the last several months.    Past Medical History:   Patient Active Problem List   Diagnosis     Major depressive disorder, recurrent episode, in partial remission (H)     Female-to-male transgender person     CARDIOVASCULAR SCREENING; LDL GOAL LESS THAN 130     Past Medical History:   Diagnosis Date     Congenital dislocation of hip, unilateral birth    Congenital hip dislocation     Varicella without mention of complication     varicella (6 lesions-age 6 weeks)     Volume depletion     dehydration with C. Diff-1998, dehydration with Rotovirus-2000     Past Surgical History:   Procedure Laterality Date     ESOPHAGOSCOPY, GASTROSCOPY, DUODENOSCOPY (EGD), COMBINED  2/9/2014    Procedure: COMBINED ESOPHAGOSCOPY, GASTROSCOPY, DUODENOSCOPY (EGD), BIOPSY SINGLE OR MULTIPLE;  Esophagoscopy,Gastroscopy,Duodenoscopy- room 240 with stomach biopsies via  "cold forceps.;  Surgeon: Carmelo Tidwell MD;  Location: [facility]     TRANSGENDER MASTECTOMY Bilateral 7/25/2016    Procedure: TRANSGENDER MASTECTOMY;  Surgeon: Rosario Resendiz MD;  Location: [facility]       Social History:  Patient reports that he has never smoked. He has never used smokeless tobacco. He reports that he does not drink alcohol or use drugs.    Family History:  Family History   Problem Relation Age of Onset     Cancer Maternal Grandfather         breast     Cancer Paternal Grandmother         prostate     Neurologic Disorder Maternal Aunt         migraines     Neurologic Disorder Mother         migraines         Skin Cancer Mother         BCC     Gastrointestinal Disease Maternal Aunt         crohns     Allergies Father         Food allergy       Medications:  Current Outpatient Medications   Medication Sig Dispense Refill     sertraline (ZOLOFT) 100 MG tablet Take 1 tablet (100 mg) by mouth daily 90 tablet 3     Syringe/Needle, Disp, 23G X 1\" 1 ML MISC To use with weekly IM injection 50 each 3     testosterone cypionate (DEPOTESTOSTERONE) 200 MG/ML injection Inject 0.3 mLs (60 mg) into the muscle once a week 4 mL 5     finasteride (PROPECIA) 1 MG tablet Take 1 tab PO daily (Patient not taking: Reported on 5/21/2019) 90 tablet 3        Allergies   Allergen Reactions     Amitriptyline-Perphenazine [Perphenazine-Amitriptyline] GI Disturbance     Pt developed an ulcer after taking med       Review of Systems:  -As per HPI  -Constitutional: Otherwise feeling well today, in usual state of health.  -Skin: As above in HPI. No additional skin concerns.    Physical exam:  Vitals: There were no vitals taken for this visit.  GEN: This is a well developed, well-nourished male in no acute distress, in a pleasant mood.    SKIN: Focused examination of the face and scalp was performed.  - Pattern hair loss with thinning of the fronto-temporal hair line, currently without thinning of the vertex scalp  - Hair pull " test negative  - No background erythema or scale of the scalp  - No other lesions of concern on areas examined.     Impression/Plan:  1. Pattern hair loss: Reviewed that this process is driven by testosterone. Unsure if the dose can be adjusted - a reduced dose may help with the hair loss but we leave this decision to the prescribing physician, Dr. Gibson. For now will recommend a trial of lower dose Propecia - would reduce to every other day dosing which will hopefully avoid the unintended side effect (DUB).     Maximize Rogaine with BID dosing - can do 2 applications at night if preferred    Recommend trial of lower dose finasteride, 1mg every other day     Consider trial of laser light comb, handout provided    Consider lower dose of testosterone in the future - to be discussed with Dr. Gibson      CC Stalin Gibson MD  No address on file on close of this encounter.  Follow-up in 3 months, earlier for new or changing lesions.     Dr. Miller staffed the patient.    Staff Involved:  Resident(Stalin Lopez)/Staff    Stalin Lopez MD  Dermatology Resident, PGY3    Staff Physician Comments:   I saw and evaluated the patient with the resident and I agree with the assessment and plan.  I was present for the examination. I have made edits if needed.    Alejandro Miller MD  Staff Dermatologist and Dermatopathologist  , Department of Dermatology

## 2019-09-04 DIAGNOSIS — Z78.9 FEMALE-TO-MALE TRANSGENDER PERSON: ICD-10-CM

## 2019-09-04 DIAGNOSIS — F64.0 GENDER DYSPHORIA IN ADOLESCENT AND ADULT: ICD-10-CM

## 2019-09-04 RX ORDER — TESTOSTERONE CYPIONATE 200 MG/ML
60 INJECTION, SOLUTION INTRAMUSCULAR WEEKLY
Qty: 4 ML | Refills: 2 | Status: SHIPPED | OUTPATIENT
Start: 2019-09-04

## 2019-09-04 NOTE — TELEPHONE ENCOUNTER
Requested Medication: Testosterone Cyp  Dose: 0.3ml  Quantity: 200 mg/ml  Refills: 2    Inject 0.3ml (60mg) once weekly    Last seen at [facility]: 4/25 - 6 mo follow up  Next Appointment with Provider:  Visit date not found      Marlen Rodriguez CMA

## 2019-09-26 ENCOUNTER — HOSPITAL ENCOUNTER (OUTPATIENT)
Dept: ULTRASOUND IMAGING | Facility: CLINIC | Age: 24
Discharge: HOME | End: 2019-09-26
Attending: NURSE PRACTITIONER | Admitting: NURSE PRACTITIONER
Payer: COMMERCIAL

## 2019-09-26 DIAGNOSIS — N92.1 BREAKTHROUGH BLEEDING: ICD-10-CM

## 2019-09-26 PROCEDURE — 76830 TRANSVAGINAL US NON-OB: CPT

## 2019-09-30 ENCOUNTER — HEALTH MAINTENANCE LETTER (OUTPATIENT)
Age: 24
End: 2019-09-30

## 2019-10-01 ENCOUNTER — OFFICE VISIT (OUTPATIENT)
Dept: DERMATOLOGY | Facility: CLINIC | Age: 24
End: 2019-10-01
Payer: COMMERCIAL

## 2019-10-01 DIAGNOSIS — L64.9 ANDROGENETIC ALOPECIA: Primary | ICD-10-CM

## 2019-10-01 ASSESSMENT — PAIN SCALES - GENERAL: PAINLEVEL: NO PAIN (0)

## 2019-10-01 NOTE — NURSING NOTE
Chief Complaint   Patient presents with     Hair/Scalp Problem     Rohan is here today for Hair Loss follow up. Rohan states he would like to start Propecia, he saw Dr. Gibson for Hormonal testing which was inconclusive. Rohan then saw a provide at [place] and has a follow up on Friday.      Araceli Strickland LPN

## 2019-10-01 NOTE — LETTER
"10/1/2019       RE: Rohan Wilson  [address] [place] [address]     Dear Colleague,    Thank you for referring your patient, Rohan Wilson, to the [facility] DERMATOLOGY at [facility]. Please see a copy of my visit note below.    [facility] Dermatology Note      Dermatology Problem List:  1. Pattern hair loss  - Tx: Rogaine foam and/or liquid BID, finasteride 1mg QOD    Encounter Date: Oct 1, 2019    CC:  Chief Complaint   Patient presents with     Hair/Scalp Problem     Rohan is here today for Hair Loss follow up. Rohan states he would like to start Propecia, he saw Dr. Gibson for Hormonal testing which was inconclusive. Rohan then saw a provide at [place] and has a follow up on Friday.      History of Present Illness:  Mr. Rohan Wilson is a 24 year old male who presents as a follow-up for hair loss. The patient was last seen 5/21/19 when he continued on minoxidil BID and finasteride every other day. At that time, decreasing his testosterone dosing was discussed. Today, the patient reports that he he has not been noticing much of any change in his hair loss. He has been noticing constant shedding. He states \"every morning, the sink gets clogged with my hair,\" and he notes that there has been thinning of his hair on his frontal scalp and frontal hairline. He has discontinued finasteride since his last visit. The patient voices no other concerns.    Past Medical History:   Patient Active Problem List   Diagnosis     Major depressive disorder, recurrent episode, in partial remission (H)     Female-to-male transgender person     CARDIOVASCULAR SCREENING; LDL GOAL LESS THAN 130     Past Medical History:   Diagnosis Date     Congenital dislocation of hip, unilateral birth    Congenital hip dislocation     Varicella without mention of complication     varicella (6 lesions-age 6 weeks)     Volume depletion     dehydration with C. " "Diff-1998, dehydration with Rotovirus-2000     Past Surgical History:   Procedure Laterality Date     ESOPHAGOSCOPY, GASTROSCOPY, DUODENOSCOPY (EGD), COMBINED  2/9/2014    Procedure: COMBINED ESOPHAGOSCOPY, GASTROSCOPY, DUODENOSCOPY (EGD), BIOPSY SINGLE OR MULTIPLE;  Esophagoscopy,Gastroscopy,Duodenoscopy- room 240 with stomach biopsies via cold forceps.;  Surgeon: Carmelo Tidwell MD;  Location: [facility]     TRANSGENDER MASTECTOMY Bilateral 7/25/2016    Procedure: TRANSGENDER MASTECTOMY;  Surgeon: Rosario Resendiz MD;  [facility]       Social History:  Patient reports that he has never smoked. He has never used smokeless tobacco. He reports that he does not drink alcohol or use drugs.    Family History:  Family History   Problem Relation Age of Onset     Cancer Maternal Grandfather         breast     Cancer Paternal Grandmother         prostate     Neurologic Disorder Maternal Aunt         migraines     Neurologic Disorder Mother         migraines         Skin Cancer Mother         BCC     Gastrointestinal Disease Maternal Aunt         crohns     Allergies Father         Food allergy       Medications:  Current Outpatient Medications   Medication Sig Dispense Refill     Syringe/Needle, Disp, 23G X 1\" 1 ML MISC To use with weekly IM injection 50 each 3     testosterone cypionate (DEPOTESTOSTERONE) 200 MG/ML injection Inject 0.3 mLs (60 mg) into the muscle once a week 4 mL 2     finasteride (PROPECIA) 1 MG tablet Take 1 tab every other day. (Patient not taking: Reported on 10/1/2019) 60 tablet 2     sertraline (ZOLOFT) 100 MG tablet Take 1 tablet (100 mg) by mouth daily 90 tablet 3       Allergies   Allergen Reactions     Amitriptyline-Perphenazine [Perphenazine-Amitriptyline] GI Disturbance     Pt developed an ulcer after taking med       Review of Systems:  -Constitutional: Otherwise feeling well today, in usual state of health.  -HEENT: Patient denies nonhealing oral sores.  -Skin: As above in HPI. No " additional skin concerns.    Physical exam:  Vitals: There were no vitals taken for this visit.  GEN: This is a well developed, well-nourished male in no acute distress, in a pleasant mood.    SKIN: Focused examination of the scalp was performed.  -Perez skin type: II    -Pattern hair loss with thinning of the fronto-temporal hair line, currently without thinning of the vertex scalp  -No other lesions of concern on areas examined.       Impression/Plan:  1. Pattern hair loss: Reviewed that this process is driven by testosterone. Unsure if the dose can be adjusted - a reduced dose may help with the hair loss but we leave this decision to the prescribing physician, Dr. Gibson.     Discussed the risks, benefits and efficacy of treatment with a laser light comb vs PRP scalp injections     Maximize Rogaine with BID dosing - can do 2 applications at night if preferred    Can do minoxidil orally - will discuss with patient    Continue finasteride every other day     Consider trial of laser light comb, handout provided    Referral placed to cosmetic derm for evaluation and possible treatment with PRP injections    Pt will consult their insurance provider to determine if PRP will be covered    Follow-up for PRN new or changing lesions.     Staff Involved:  Scribe/Staff    Scribe Disclosure  I, Dominic Najjar, am serving as a scribe to document services personally performed by Dr. Alejandro Miller MD, based on data collection and the provider's statements to me.     Staff attestation:  The documentation recorded by the scribe accurately reflects the services I personally performed and the decisions I personally made. I have made edits where needed.    Alejandro Miller MD  Staff Dermatologist and Dermatopathologist  , Department of Dermatology

## 2019-10-01 NOTE — PROGRESS NOTES
"[facility] Dermatology Note      Dermatology Problem List:  1. Pattern hair loss  - Tx: Rogaine foam and/or liquid BID, finasteride 1mg QOD    Encounter Date: Oct 1, 2019    CC:  Chief Complaint   Patient presents with     Hair/Scalp Problem     Rohan is here today for Hair Loss follow up. Rohan states he would like to start Propecia, he saw Dr. Gibson for Hormonal testing which was inconclusive. Rohan then saw a provide at [place] and has a follow up on Friday.      History of Present Illness:  Mr. Rohan Wilson is a 24 year old male who presents as a follow-up for hair loss. The patient was last seen 5/21/19 when he continued on minoxidil BID and finasteride every other day. At that time, decreasing his testosterone dosing was discussed. Today, the patient reports that he he has not been noticing much of any change in his hair loss. He has been noticing constant shedding. He states \"every morning, the sink gets clogged with my hair,\" and he notes that there has been thinning of his hair on his frontal scalp and frontal hairline. He has discontinued finasteride since his last visit. The patient voices no other concerns.    Past Medical History:   Patient Active Problem List   Diagnosis     Major depressive disorder, recurrent episode, in partial remission (H)     Female-to-male transgender person     CARDIOVASCULAR SCREENING; LDL GOAL LESS THAN 130     Past Medical History:   Diagnosis Date     Congenital dislocation of hip, unilateral birth    Congenital hip dislocation     Varicella without mention of complication     varicella (6 lesions-age 6 weeks)     Volume depletion     dehydration with C. Diff-1998, dehydration with Rotovirus-2000     Past Surgical History:   Procedure Laterality Date     ESOPHAGOSCOPY, GASTROSCOPY, DUODENOSCOPY (EGD), COMBINED  2/9/2014    Procedure: COMBINED ESOPHAGOSCOPY, GASTROSCOPY, DUODENOSCOPY (EGD), BIOPSY SINGLE OR MULTIPLE;  " "Esophagoscopy,Gastroscopy,Duodenoscopy- room 240 with stomach biopsies via cold forceps.;  Surgeon: Carmelo Tidwell MD;  Location: [facility]     TRANSGENDER MASTECTOMY Bilateral 7/25/2016    Procedure: TRANSGENDER MASTECTOMY;  Surgeon: Rosario Resendiz MD;  Location: [facility]       Social History:  Patient reports that he has never smoked. He has never used smokeless tobacco. He reports that he does not drink alcohol or use drugs.    Family History:  Family History   Problem Relation Age of Onset     Cancer Maternal Grandfather         breast     Cancer Paternal Grandmother         prostate     Neurologic Disorder Maternal Aunt         migraines     Neurologic Disorder Mother         migraines         Skin Cancer Mother         BCC     Gastrointestinal Disease Maternal Aunt         crohns     Allergies Father         Food allergy       Medications:  Current Outpatient Medications   Medication Sig Dispense Refill     Syringe/Needle, Disp, 23G X 1\" 1 ML MISC To use with weekly IM injection 50 each 3     testosterone cypionate (DEPOTESTOSTERONE) 200 MG/ML injection Inject 0.3 mLs (60 mg) into the muscle once a week 4 mL 2     finasteride (PROPECIA) 1 MG tablet Take 1 tab every other day. (Patient not taking: Reported on 10/1/2019) 60 tablet 2     sertraline (ZOLOFT) 100 MG tablet Take 1 tablet (100 mg) by mouth daily 90 tablet 3       Allergies   Allergen Reactions     Amitriptyline-Perphenazine [Perphenazine-Amitriptyline] GI Disturbance     Pt developed an ulcer after taking med       Review of Systems:  -Constitutional: Otherwise feeling well today, in usual state of health.  -HEENT: Patient denies nonhealing oral sores.  -Skin: As above in HPI. No additional skin concerns.    Physical exam:  Vitals: There were no vitals taken for this visit.  GEN: This is a well developed, well-nourished male in no acute distress, in a pleasant mood.    SKIN: Focused examination of the scalp was performed.  -Perez skin " type: II    -Pattern hair loss with thinning of the fronto-temporal hair line, currently without thinning of the vertex scalp  -No other lesions of concern on areas examined.       Impression/Plan:  1. Pattern hair loss: Reviewed that this process is driven by testosterone. Unsure if the dose can be adjusted - a reduced dose may help with the hair loss but we leave this decision to the prescribing physician, Dr. Gibson.     Discussed the risks, benefits and efficacy of treatment with a laser light comb vs PRP scalp injections     Maximize Rogaine with BID dosing - can do 2 applications at night if preferred    Can do minoxidil orally - will discuss with patient    Continue finasteride every other day     Consider trial of laser light comb, handout provided    Referral placed to cosmetic derm for evaluation and possible treatment with PRP injections    Pt will consult their insurance provider to determine if PRP will be covered    Follow-up for PRN new or changing lesions.     Staff Involved:  Scribe/Staff    Scribe Disclosure  I, Dominic Najjar, am serving as a scribe to document services personally performed by Dr. Alejandro Miller MD, based on data collection and the provider's statements to me.     Staff attestation:  The documentation recorded by the scribe accurately reflects the services I personally performed and the decisions I personally made. I have made edits where needed.    Alejandro Miller MD  Staff Dermatologist and Dermatopathologist  , Department of Dermatology

## 2019-10-15 ENCOUNTER — MYC MEDICAL ADVICE (OUTPATIENT)
Dept: DERMATOLOGY | Facility: CLINIC | Age: 24
End: 2019-10-15

## 2019-10-15 DIAGNOSIS — L64.9 ANDROGENETIC ALOPECIA: Primary | ICD-10-CM

## 2019-10-22 RX ORDER — MINOXIDIL 2.5 MG/1
2.5 TABLET ORAL DAILY
Qty: 30 TABLET | Refills: 2 | Status: SHIPPED | OUTPATIENT
Start: 2019-10-22 | End: 2020-01-28

## 2019-11-03 ENCOUNTER — OFFICE VISIT (OUTPATIENT)
Dept: URGENT CARE | Facility: URGENT CARE | Age: 24
End: 2019-11-03
Payer: COMMERCIAL

## 2019-11-03 VITALS
SYSTOLIC BLOOD PRESSURE: 104 MMHG | TEMPERATURE: 97.7 F | OXYGEN SATURATION: 96 % | HEART RATE: 52 BPM | DIASTOLIC BLOOD PRESSURE: 62 MMHG | BODY MASS INDEX: 23.14 KG/M2 | WEIGHT: 156.7 LBS

## 2019-11-03 DIAGNOSIS — J01.90 ACUTE SINUSITIS WITH SYMPTOMS GREATER THAN 10 DAYS: Primary | ICD-10-CM

## 2019-11-03 PROCEDURE — 99213 OFFICE O/P EST LOW 20 MIN: CPT | Performed by: PHYSICIAN ASSISTANT

## 2019-11-03 NOTE — PATIENT INSTRUCTIONS
1.  Plenty of fluids, rest, warm compresses on face  2.  Mucinex twice daily for at least 4 days  3.  Yisel Pot 1x in the morning 1x at night (SALINE MIST SPRAY IS AN ACCEPTABLE, THOUGH NOT AS EFFECTIVE REPLACEMENT)  4.  Benadryl (diphenhydramine) at bedtime   5.  Either Claritin (Loratadine), Allegra (Fexofenadine), or Zyrtec (Cetirizine) in the day  6.  Flonase (Fluticasone) 2x each nostril twice a day for two weeks, then once each nostril once a day  7. Antibiotic twice daily for 7 Days  8. Probiotic (ie Culturelle) 1-2 hours after each antibiotic dose  9. IBUPROFEN 800mg three times a day for 3-5 days     Please let us know if symptoms persist, or worsen.      Patient Education     Self-Care for Sinusitis     Drinking plenty of water can help sinuses drain.   Sinusitis can often be managed with self-care. Self-care can keep sinuses moist and make you feel more comfortable. Remember to follow your doctor's instructions closely. This can make a big difference in getting your sinus problem under control.  Drink fluids  Drinking extra fluids helps thin your mucus. This lets it drain from your sinuses more easily. Have a glass of water every hour or two. A humidifier helps in much the same way. Fluids can also offset the drying effects of certain medicines. If you use a humidifier, follow the product maker's instructions on how to use it. Clean it on a regular schedule.  Use saltwater rinses  Rinses help keep your sinuses and nose moist. Mix a teaspoon of salt in 8 ounces of fresh, warm water. Use a bulb syringe to gently squirt the water into your nose a few times a day. You can also buy ready-made saline nasal sprays.  Apply hot or cold packs  Applying heat to the area surrounding your sinuses may make you feel more comfortable. Use a hot water bottle or a hand towel dipped in hot water. Some people also find ice packs effective for relieving pain.  Medicines  Your doctor may prescribe medications to help treat  your sinusitis. If you have an infection, antibiotics can help clear it up. If you are prescribed antibiotics, take all pills on schedule until they are gone, even if you feel better. Decongestants help relieve swelling. Use decongestant sprays for short periods only under the direction of your doctor. If you have allergies, your doctor may prescribe medications to help relieve them.   Date Last Reviewed: 10/1/2016    2089-6622 [employer]. [address] [place] [address], [place] [address]. All rights reserved. This information is not intended as a substitute for professional medical care. Always follow your healthcare professional's instructions.               Patient Education     Sinusitis (Antibiotic Treatment)    The sinuses are air-filled spaces within the bones of the face. They connect to the inside of the nose. Sinusitis is an inflammation of the tissue that lines the sinuses. Sinusitis can occur during a cold. It can also happen due to allergies to pollens and other particles in the air. Sinusitis can cause symptoms of sinus congestion and a feeling of fullness. A sinus infection causes fever, headache, and facial pain. There is often green or yellow fluid draining from the nose or into the back of the throat (post-nasal drip). You have been given antibiotics to treat this condition.  Home care    Take the full course of antibiotics as instructed. Do not stop taking them, even when you feel better.    Drink plenty of water, hot tea, and other liquids. This may help thin nasal mucus. It also may help your sinuses drain fluids.    Heat may help soothe painful areas of your face. Use a towel soaked in hot water. Or,  the shower and direct the warm spray onto your face. Using a vaporizer along with a menthol rub at night may also help soothe symptoms.     An expectorant with guaifenesin may help thin nasal mucus and help your sinuses drain fluids.    You can use an  over-the-counter decongestant, unless a similar medicine was prescribed to you. Nasal sprays work the fastest. Use one that contains phenylephrine or oxymetazoline. First blow your nose gently. Then use the spray. Do not use these medicines more often than directed on the label. If you do, your symptoms may get worse. You may also take pills that contain pseudoephedrine. Don t use products that combine multiple medicines. This is because side effects may be increased. Read labels. You can also ask the pharmacist for help. (People with high blood pressure should not use decongestants. They can raise blood pressure.)    Over-the-counter antihistamines may help if allergies contributed to your sinusitis.      Do not use nasal rinses or irrigation during an acute sinus infection, unless your healthcare provider tells you to. Rinsing may spread the infection to other areas in your sinuses.    Use acetaminophen or ibuprofen to control pain, unless another pain medicine was prescribed to you. If you have chronic liver or kidney disease or ever had a stomach ulcer, talk with your healthcare provider before using these medicines. (Aspirin should never be taken by anyone under age 18 who is ill with a fever. It may cause severe liver damage.)    Don't smoke. This can make symptoms worse.  Follow-up care  Follow up with your healthcare provider or our staff if you are not better in 1 week.  When to seek medical advice  Call your healthcare provider if any of these occur:    Facial pain or headache that gets worse    Stiff neck    Unusual drowsiness or confusion    Swelling of your forehead or eyelids    Vision problems, such as blurred or double vision    Fever of 100.4 F (38 C) or higher, or as directed by your healthcare provider    Seizure    Breathing problems    Symptoms don't go away in 10 days  Prevention  Here are steps you can take to help prevent an infection:    Keep good hand washing habits.    Don t have close  contact with people who have sore throats, colds, or other upper respiratory infections.    Don t smoke, and stay away from secondhand smoke.    Stay up to date with of your vaccines.  Date Last Reviewed: 11/1/2017 2000-2018 [employer]. [address] [place] [address]. All rights reserved. This information is not intended as a substitute for professional medical care. Always follow your healthcare professional's instructions.

## 2019-11-03 NOTE — PROGRESS NOTES
SUBJECTIVE:  Rohan Wilson is a 24 year old male here with concerns about sinus infection.  He states onset of symptoms were 8 day(s) ago.  He has had maxillary, frontal pressure. Course of illness is worsening. Severity moderate  Current and Associated symptoms: feverish, nasal congestion, facial pain/pressure and headache  Predisposing factors include recent illness. Recent treatment has included: Decongestants and mucinex    Past Medical History:   Diagnosis Date     Congenital dislocation of hip, unilateral birth    Congenital hip dislocation     Varicella without mention of complication     varicella (6 lesions-age 6 weeks)     Volume depletion     dehydration with C. Diff-1998, dehydration with Rotovirus-2000     Social History     Tobacco Use     Smoking status: Never Smoker     Smokeless tobacco: Never Used     Tobacco comment: no smokers in the home   Substance Use Topics     Alcohol use: No     Alcohol/week: 0.0 standard drinks       ROS:  10 point ROS negative except as listed above      OBJECTIVE:  /62   Pulse 52   Temp 97.7  F (36.5  C)   Wt 71.1 kg (156 lb 11.2 oz)   SpO2 96%   BMI 23.14 kg/m    Exam:  GENERAL APPEARANCE: healthy, alert and no distress  EYES: conjunctiva clear  HENT: ear canals and TM's normal.  Nose and mouth without ulcers, erythema or lesions  NECK: supple, nontender, no lymphadenopathy  RESP: lungs clear to auscultation - no rales, rhonchi or wheezes  CV: regular rates and rhythm, normal S1 S2, no murmur noted  NEURO: Normal strength and tone, sensory exam grossly normal,  normal speech and mentation  SKIN: no suspicious lesions or rashes    ASSESSMENT:  (J01.90) Acute sinusitis with symptoms greater than 10 days  (primary encounter diagnosis)  Plan: amoxicillin-clavulanate (AUGMENTIN) 875-125 MG         tablet  Follow up with PCP if symptoms worsen or fail to improve      Patient Instructions         1.  Plenty of fluids, rest, warm compresses on face  2.  Mucinex  twice daily for at least 4 days  3.  Yisel Pot 1x in the morning 1x at night (SALINE MIST SPRAY IS AN ACCEPTABLE, THOUGH NOT AS EFFECTIVE REPLACEMENT)  4.  Benadryl (diphenhydramine) at bedtime   5.  Either Claritin (Loratadine), Allegra (Fexofenadine), or Zyrtec (Cetirizine) in the day  6.  Flonase (Fluticasone) 2x each nostril twice a day for two weeks, then once each nostril once a day  7. Antibiotic twice daily for 7 Days  8. Probiotic (ie Culturelle) 1-2 hours after each antibiotic dose  9. IBUPROFEN 800mg three times a day for 3-5 days     Please let us know if symptoms persist, or worsen.      Patient Education     Self-Care for Sinusitis     Drinking plenty of water can help sinuses drain.   Sinusitis can often be managed with self-care. Self-care can keep sinuses moist and make you feel more comfortable. Remember to follow your doctor's instructions closely. This can make a big difference in getting your sinus problem under control.  Drink fluids  Drinking extra fluids helps thin your mucus. This lets it drain from your sinuses more easily. Have a glass of water every hour or two. A humidifier helps in much the same way. Fluids can also offset the drying effects of certain medicines. If you use a humidifier, follow the product maker's instructions on how to use it. Clean it on a regular schedule.  Use saltwater rinses  Rinses help keep your sinuses and nose moist. Mix a teaspoon of salt in 8 ounces of fresh, warm water. Use a bulb syringe to gently squirt the water into your nose a few times a day. You can also buy ready-made saline nasal sprays.  Apply hot or cold packs  Applying heat to the area surrounding your sinuses may make you feel more comfortable. Use a hot water bottle or a hand towel dipped in hot water. Some people also find ice packs effective for relieving pain.  Medicines  Your doctor may prescribe medications to help treat your sinusitis. If you have an infection, antibiotics can help clear  it up. If you are prescribed antibiotics, take all pills on schedule until they are gone, even if you feel better. Decongestants help relieve swelling. Use decongestant sprays for short periods only under the direction of your doctor. If you have allergies, your doctor may prescribe medications to help relieve them.   Date Last Reviewed: 10/1/2016    6361-4942 [employer]. [address] [place] [address], [place] [address]. All rights reserved. This information is not intended as a substitute for professional medical care. Always follow your healthcare professional's instructions.               Patient Education     Sinusitis (Antibiotic Treatment)    The sinuses are air-filled spaces within the bones of the face. They connect to the inside of the nose. Sinusitis is an inflammation of the tissue that lines the sinuses. Sinusitis can occur during a cold. It can also happen due to allergies to pollens and other particles in the air. Sinusitis can cause symptoms of sinus congestion and a feeling of fullness. A sinus infection causes fever, headache, and facial pain. There is often green or yellow fluid draining from the nose or into the back of the throat (post-nasal drip). You have been given antibiotics to treat this condition.  Home care    Take the full course of antibiotics as instructed. Do not stop taking them, even when you feel better.    Drink plenty of water, hot tea, and other liquids. This may help thin nasal mucus. It also may help your sinuses drain fluids.    Heat may help soothe painful areas of your face. Use a towel soaked in hot water. Or,  the shower and direct the warm spray onto your face. Using a vaporizer along with a menthol rub at night may also help soothe symptoms.     An expectorant with guaifenesin may help thin nasal mucus and help your sinuses drain fluids.    You can use an over-the-counter decongestant, unless a similar medicine was prescribed to you. Nasal sprays work  the fastest. Use one that contains phenylephrine or oxymetazoline. First blow your nose gently. Then use the spray. Do not use these medicines more often than directed on the label. If you do, your symptoms may get worse. You may also take pills that contain pseudoephedrine. Don t use products that combine multiple medicines. This is because side effects may be increased. Read labels. You can also ask the pharmacist for help. (People with high blood pressure should not use decongestants. They can raise blood pressure.)    Over-the-counter antihistamines may help if allergies contributed to your sinusitis.      Do not use nasal rinses or irrigation during an acute sinus infection, unless your healthcare provider tells you to. Rinsing may spread the infection to other areas in your sinuses.    Use acetaminophen or ibuprofen to control pain, unless another pain medicine was prescribed to you. If you have chronic liver or kidney disease or ever had a stomach ulcer, talk with your healthcare provider before using these medicines. (Aspirin should never be taken by anyone under age 18 who is ill with a fever. It may cause severe liver damage.)    Don't smoke. This can make symptoms worse.  Follow-up care  Follow up with your healthcare provider or our staff if you are not better in 1 week.  When to seek medical advice  Call your healthcare provider if any of these occur:    Facial pain or headache that gets worse    Stiff neck    Unusual drowsiness or confusion    Swelling of your forehead or eyelids    Vision problems, such as blurred or double vision    Fever of 100.4 F (38 C) or higher, or as directed by your healthcare provider    Seizure    Breathing problems    Symptoms don't go away in 10 days  Prevention  Here are steps you can take to help prevent an infection:    Keep good hand washing habits.    Don t have close contact with people who have sore throats, colds, or other upper respiratory infections.    Don t  smoke, and stay away from secondhand smoke.    Stay up to date with of your vaccines.  Date Last Reviewed: 11/1/2017 2000-2018 [employer]. [address] [place] [address], [place] [address]. All rights reserved. This information is not intended as a substitute for professional medical care. Always follow your healthcare professional's instructions.

## 2019-11-21 DIAGNOSIS — L64.9 ANDROGENETIC ALOPECIA: ICD-10-CM

## 2019-11-22 RX ORDER — MINOXIDIL 2.5 MG/1
TABLET ORAL
Qty: 30 TABLET | Refills: 2 | OUTPATIENT
Start: 2019-11-22

## 2019-12-23 DIAGNOSIS — L64.9 ANDROGENETIC ALOPECIA: ICD-10-CM

## 2019-12-26 ENCOUNTER — OFFICE VISIT (OUTPATIENT)
Dept: URGENT CARE | Facility: URGENT CARE | Age: 24
End: 2019-12-26
Payer: COMMERCIAL

## 2019-12-26 VITALS
HEART RATE: 62 BPM | TEMPERATURE: 98 F | OXYGEN SATURATION: 97 % | SYSTOLIC BLOOD PRESSURE: 110 MMHG | RESPIRATION RATE: 20 BRPM | DIASTOLIC BLOOD PRESSURE: 64 MMHG

## 2019-12-26 DIAGNOSIS — J01.90 ACUTE SINUSITIS WITH SYMPTOMS > 10 DAYS: Primary | ICD-10-CM

## 2019-12-26 PROCEDURE — 99213 OFFICE O/P EST LOW 20 MIN: CPT | Performed by: FAMILY MEDICINE

## 2019-12-26 NOTE — PROGRESS NOTES
"Subjective     Rohan Wilson is a 24 year old male who presents to clinic today for the following health issues:    HPI   Cold sx x 3 weeks now settled in LEFT eye frontal and maxillary sinuses.  No fever or chills.  Using Sudafed to relieve the pressure.      Patient Active Problem List   Diagnosis     Major depressive disorder, recurrent episode, in partial remission (H)     Female-to-male transgender person     CARDIOVASCULAR SCREENING; LDL GOAL LESS THAN 130     Past Surgical History:   Procedure Laterality Date     ESOPHAGOSCOPY, GASTROSCOPY, DUODENOSCOPY (EGD), COMBINED  2/9/2014    Procedure: COMBINED ESOPHAGOSCOPY, GASTROSCOPY, DUODENOSCOPY (EGD), BIOPSY SINGLE OR MULTIPLE;  Esophagoscopy,Gastroscopy,Duodenoscopy- room 240 with stomach biopsies via cold forceps.;  Surgeon: Carmelo Tidwell MD;  Location: [facility]     TRANSGENDER MASTECTOMY Bilateral 7/25/2016    Procedure: TRANSGENDER MASTECTOMY;  Surgeon: Rosario Resendiz MD;  Location:  OR       Social History     Tobacco Use     Smoking status: Never Smoker     Smokeless tobacco: Never Used     Tobacco comment: no smokers in the home   Substance Use Topics     Alcohol use: No     Alcohol/week: 0.0 standard drinks     Family History   Problem Relation Age of Onset     Cancer Maternal Grandfather         breast     Cancer Paternal Grandmother         prostate     Neurologic Disorder Maternal Aunt         migraines     Neurologic Disorder Mother         migraines         Skin Cancer Mother         BCC     Gastrointestinal Disease Maternal Aunt         crohns     Allergies Father         Food allergy         Current Outpatient Medications   Medication Sig Dispense Refill     minoxidil (LONITEN) 2.5 MG tablet Take 1 tablet (2.5 mg) by mouth daily 30 tablet 2     sertraline (ZOLOFT) 100 MG tablet Take 1 tablet (100 mg) by mouth daily 90 tablet 3     Syringe/Needle, Disp, 23G X 1\" 1 ML MISC To use with weekly IM injection 50 each 3     testosterone " cypionate (DEPOTESTOSTERONE) 200 MG/ML injection Inject 0.3 mLs (60 mg) into the muscle once a week 4 mL 2     finasteride (PROPECIA) 1 MG tablet Take 1 tab every other day. (Patient not taking: Reported on 10/1/2019) 60 tablet 2     Allergies   Allergen Reactions     Amitriptyline-Perphenazine [Perphenazine-Amitriptyline] GI Disturbance     Pt developed an ulcer after taking med     Recent Labs   Lab Test 03/05/19  1440 11/23/18  1101 11/29/17  1330 12/29/16  1315 07/14/16  1259  02/09/14  0602  10/16/13  1451   LDL 73  --  83.0 92  --    < >  --    < > 76   HDL 58  --  57.0 60  --    < >  --    < > 50   TRIG 74  --  91.0 42  --    < >  --    < > 219*   ALT 19  --  9.0  --  23   < > 37  --   --    CR  --   --   --   --  0.84  --  0.76  --  0.60   GFRESTIMATED  --   --   --   --  86  --  >90  --  >90   GFRESTBLACK  --   --   --   --  >90  African American GFR Calc    --  >90  --  >90   POTASSIUM  --   --   --   --  3.9  --  3.6  --  3.8   TSH  --  1.76  --   --   --   --   --   --  2.26    < > = values in this interval not displayed.      BP Readings from Last 3 Encounters:   12/26/19 110/64   11/03/19 104/62   01/29/19 110/64    Wt Readings from Last 3 Encounters:   11/03/19 71.1 kg (156 lb 11.2 oz)   01/29/19 69.4 kg (153 lb)   09/14/18 70.2 kg (154 lb 12.8 oz)                    Reviewed and updated as needed this visit by Provider         Review of Systems   ROS COMP: Constitutional, HEENT, cardiovascular, pulmonary, gi and gu systems are negative, except as otherwise noted.      Objective    /64   Pulse 62   Temp 98  F (36.7  C) (Tympanic)   Resp 20   SpO2 97%   There is no height or weight on file to calculate BMI.  Physical Exam   GENERAL: healthy, alert and no distress  EYES: Eyes grossly normal to inspection, PERRL and conjunctivae and sclerae normal  HENT: ear canals and TM's normal, nose and mouth without ulcers or lesions, + pain on palp over LEFT frontal and LEFT maxillary sinuses  NECK: no  adenopathy, no asymmetry, masses, or scars and thyroid normal to palpation  RESP: lungs clear to auscultation - no rales, rhonchi or wheezes  CV: regular rate and rhythm, normal S1 S2, no S3 or S4, no murmur, click or rub, no peripheral edema and peripheral pulses strong  ABDOMEN: soft, nontender, no hepatosplenomegaly, no masses and bowel sounds normal  MS: no gross musculoskeletal defects noted, no edema  PSYCH: mentation appears normal, affect normal/bright        Assessment & Plan     1. Acute sinusitis with symptoms > 10 days    - amoxicillin-clavulanate (AUGMENTIN) 875-125 MG tablet; Take 1 tablet by mouth 2 times daily for 10 days  Dispense: 20 tablet; Refill: 0     Treat with Augmentin.  Increased fluids and rest.  Close Follow-up if no change or worsening sx prn.    Clari Laguerre MD  [facility]

## 2019-12-27 RX ORDER — MINOXIDIL 2.5 MG/1
TABLET ORAL
Qty: 30 TABLET | Refills: 2 | OUTPATIENT
Start: 2019-12-27

## 2019-12-27 NOTE — TELEPHONE ENCOUNTER
Refill request for minoxidil (LONITEN) 2.5 MG tablet denied.    Confirmed with pharmacy that there is 1 refill available.    Nita Braxton RN  [facility] Med Refill Team

## 2020-01-25 DIAGNOSIS — L64.9 ANDROGENETIC ALOPECIA: ICD-10-CM

## 2020-01-28 RX ORDER — MINOXIDIL 2.5 MG/1
2.5 TABLET ORAL DAILY
Qty: 90 TABLET | Refills: 1 | Status: SHIPPED | OUTPATIENT
Start: 2020-01-28 | End: 2020-07-30

## 2020-01-28 NOTE — TELEPHONE ENCOUNTER
minoxidil (LONITEN) 2.5 MG tablet      Last Written Prescription Date:  10/22/19  Last Fill Quantity: 30,   # refills: 2  Last Office Visit : 10/1/19  Future Office visit:  None    Routing refill request to provider for review/approval because:  Medication not on the Derm refill protocol.    Plan last visit 10/1/19: Pattern hair loss: Reviewed that this process is driven by testosterone. Unsure if the dose can be adjusted - a reduced dose may help with the hair loss but we leave this decision to the prescribing physician, Dr. Gibson.     Discussed the risks, benefits and efficacy of treatment with a laser light comb vs PRP scalp injections     Maximize Rogaine with BID dosing - can do 2 applications at night if preferred    Can do minoxidil orally - will discuss with patient    Continue finasteride every other day     Consider trial of laser light comb, handout provided    Referral placed to cosmetic derm for evaluation and possible treatment with PRP injections    Pt will consult their insurance provider to determine if PRP will be covered     Follow-up for PRN new or changing lesions. *no pending appt at this time.

## 2020-01-28 NOTE — TELEPHONE ENCOUNTER
Received refill request for minoxidil as the resident on call. Reviewed patient's chart and attached communication. Patient last seen 10/2019 for hair loss. RTC not scheduled. After reviewing the medication list and assessment and plan from last visit, the refill request was accepted for 3 months supply with 1 refill.

## 2020-03-05 DIAGNOSIS — F32.5 MAJOR DEPRESSIVE DISORDER WITH SINGLE EPISODE, IN FULL REMISSION (H): ICD-10-CM

## 2020-03-05 RX ORDER — SERTRALINE HYDROCHLORIDE 100 MG/1
TABLET, FILM COATED ORAL
Qty: 90 TABLET | Refills: 0 | Status: SHIPPED | OUTPATIENT
Start: 2020-03-05 | End: 2020-03-12

## 2020-03-05 NOTE — TELEPHONE ENCOUNTER
"Mom calling (on consent to communicate) to check on status of below refill request. She stated pt was supposed to put in refill request when he recently called to make an appointment but must not have. Pt will be leaving out of town tomorrow and needs to  medication today if possible. Please advise. Thanks.    Requested Prescriptions   Pending Prescriptions Disp Refills     sertraline (ZOLOFT) 100 MG tablet [Pharmacy Med Name: SERTRALINE  MG TABLET]  Last Written Prescription Date:  9/14/18  Last Fill Quantity: 90,  # refills: 3   Last office visit: 9/14/2018 with prescribing provider:  Jaime   Future Office Visit:   Next 5 appointments (look out 90 days)    Mar 12, 2020  9:40 AM CDT  SHORT with Rubia Sandoval MD  [facility] [address]  [facility] [address]  [phone]          90 tablet 3     Sig: TAKE 1 TABLET BY MOUTH EVERY DAY       SSRIs Protocol Failed - 3/5/2020 12:46 PM        Failed - PHQ-9 score less than 5 in past 6 months     Please review last PHQ-9 score.           Passed - Medication is active on med list        Passed - Patient is age 18 or older        Passed - Recent (6 mo) or future (30 days) visit within the authorizing provider's specialty     Patient had office visit in the last 6 months or has a visit in the next 30 days with authorizing provider or within the authorizing provider's specialty.  See \"Patient Info\" tab in inbasket, or \"Choose Columns\" in Meds & Orders section of the refill encounter.              "

## 2020-03-05 NOTE — TELEPHONE ENCOUNTER
Medication is being filled for 1 time refill only due to:  pt has upcoming appointment     Next 5 appointments (look out 90 days)    Mar 12, 2020  9:40 AM CDT  [facility] [address]  [facility] [id number] [phone]        Called patient and mom and left message that the prescription was sent to the pharmacy.

## 2020-03-12 ENCOUNTER — OFFICE VISIT (OUTPATIENT)
Dept: INTERNAL MEDICINE | Facility: CLINIC | Age: 25
End: 2020-03-12
Payer: COMMERCIAL

## 2020-03-12 VITALS
BODY MASS INDEX: 24.13 KG/M2 | DIASTOLIC BLOOD PRESSURE: 58 MMHG | TEMPERATURE: 96.6 F | SYSTOLIC BLOOD PRESSURE: 106 MMHG | HEART RATE: 61 BPM | WEIGHT: 162.9 LBS | HEIGHT: 69 IN | RESPIRATION RATE: 16 BRPM | OXYGEN SATURATION: 98 %

## 2020-03-12 DIAGNOSIS — F32.5 MAJOR DEPRESSIVE DISORDER WITH SINGLE EPISODE, IN FULL REMISSION (H): ICD-10-CM

## 2020-03-12 PROCEDURE — 99213 OFFICE O/P EST LOW 20 MIN: CPT | Performed by: INTERNAL MEDICINE

## 2020-03-12 RX ORDER — SERTRALINE HYDROCHLORIDE 100 MG/1
100 TABLET, FILM COATED ORAL DAILY
Qty: 90 TABLET | Refills: 3 | Status: SHIPPED | OUTPATIENT
Start: 2020-03-12 | End: 2020-05-14

## 2020-03-12 ASSESSMENT — PATIENT HEALTH QUESTIONNAIRE - PHQ9: SUM OF ALL RESPONSES TO PHQ QUESTIONS 1-9: 4

## 2020-03-12 ASSESSMENT — MIFFLIN-ST. JEOR: SCORE: 1719.29

## 2020-03-12 NOTE — PROGRESS NOTES
Subjective     Rohan Wilson is a 25 year old adult who presents to clinic today for the following health issues:    HPI   Depression Followup    How are you doing with your depression since your last visit? No change.  He feels his symptoms are well controlled.  He is continuing in school and has just 1 more semester.  He does not feel that he is ready to consider going off the medication at this time, likely it will not be until he  is done with school and has established with a job.  He is not having any side effects from the medication.    Are you having other symptoms that might be associated with depression? No    Have you had a significant life event?  No     Are you feeling anxious or having panic attacks?   No    Do you have any concerns with your use of alcohol or other drugs? No    Social History     Tobacco Use     Smoking status: Never Smoker     Smokeless tobacco: Never Used     Tobacco comment: no smokers in the home   Substance Use Topics     Alcohol use: No     Alcohol/week: 0.0 standard drinks     Drug use: No     PHQ 9/10/2017 9/15/2018 3/12/2020   PHQ-9 Total Score 0 1 4   Q9: Thoughts of better off dead/self-harm past 2 weeks Not at all Not at all Not at all     TNEZIN-7 SCORE 5/2/2016 7/8/2016 11/3/2016   Total Score 1 0 1   Some encounter information is confidential and restricted. Go to Review Flowsheets activity to see all data.           Suicide Assessment Five-step Evaluation and Treatment (SAFE-T)      How many servings of fruits and vegetables do you eat daily?  2-3    On average, how many sweetened beverages do you drink each day (Examples: soda, juice, sweet tea, etc.  Do NOT count diet or artificially sweetened beverages)?   0    How many days per week do you exercise enough to make your heart beat faster? 6    How many minutes a day do you exercise enough to make your heart beat faster? 60 or more    How many days per week do you miss taking your medication? 0      Patient Active  "Problem List   Diagnosis     Major depressive disorder, recurrent episode, in partial remission (H)     Female-to-male transgender person     CARDIOVASCULAR SCREENING; LDL GOAL LESS THAN 130     Current Outpatient Medications   Medication Sig Dispense Refill     minoxidil (LONITEN) 2.5 MG tablet Take 1 tablet (2.5 mg) by mouth daily 90 tablet 1     sertraline (ZOLOFT) 100 MG tablet TAKE 1 TABLET BY MOUTH EVERY DAY 90 tablet 0     Syringe/Needle, Disp, 23G X 1\" 1 ML MISC To use with weekly IM injection 50 each 3     testosterone cypionate (DEPOTESTOSTERONE) 200 MG/ML injection Inject 0.3 mLs (60 mg) into the muscle once a week 4 mL 2      Social History     Tobacco Use     Smoking status: Never Smoker     Smokeless tobacco: Never Used     Tobacco comment: no smokers in the home   Substance Use Topics     Alcohol use: No     Alcohol/week: 0.0 standard drinks     Drug use: No            Reviewed and updated as needed this visit by Provider         Review of Systems   negative      Objective    /58 (BP Location: Left arm, Patient Position: Sitting, Cuff Size: Adult Regular)   Pulse 61   Temp 96.6  F (35.9  C) (Oral)   Resp 16   Ht 1.753 m (5' 9\")   Wt 73.9 kg (162 lb 14.4 oz)   SpO2 98%   BMI 24.06 kg/m    Body mass index is 24.06 kg/m .  Physical Exam       Not examined.     PHQ 9/10/2017 9/15/2018 3/12/2020   PHQ-9 Total Score 0 1 4   Q9: Thoughts of better off dead/self-harm past 2 weeks Not at all Not at all Not at all             Assessment & Plan     1. Major depressive disorder with single episode, in full remission (H)  Doing well, continue medication.  We discussed options of considering going off in the future once his life is more stable but suggest he might not want to try going off of it in the late fall or winter time.  He can contact me through my chart for directions on weaning off of it in the future otherwise follow-up in 1 year.  This can be a virtual visit.  - sertraline (ZOLOFT) 100 MG " tablet; Take 1 tablet (100 mg) by mouth daily  Dispense: 90 tablet; Refill: 3           No follow-ups on file.    Rubia Sandoval MD  [facility]      \

## 2020-03-12 NOTE — NURSING NOTE
"/58 (BP Location: Left arm, Patient Position: Sitting, Cuff Size: Adult Regular)   Pulse 61   Temp 96.6  F (35.9  C) (Oral)   Resp 16   Ht 1.753 m (5' 9\")   Wt 73.9 kg (162 lb 14.4 oz)   SpO2 98%   BMI 24.06 kg/m      "

## 2020-05-14 ENCOUNTER — TELEPHONE (OUTPATIENT)
Dept: INTERNAL MEDICINE | Facility: CLINIC | Age: 25
End: 2020-05-14

## 2020-05-14 DIAGNOSIS — F32.5 MAJOR DEPRESSIVE DISORDER WITH SINGLE EPISODE, IN FULL REMISSION (H): ICD-10-CM

## 2020-05-14 RX ORDER — SERTRALINE HYDROCHLORIDE 100 MG/1
150 TABLET, FILM COATED ORAL DAILY
Qty: 135 TABLET | Refills: 2 | Status: SHIPPED | OUTPATIENT
Start: 2020-05-14 | End: 2021-02-08

## 2020-05-14 NOTE — TELEPHONE ENCOUNTER
Patient's mother calling with patient present because they state Rohan is suppose to be taking sertaline 150mg. We sent the order for 100mg. He is currently out of medicine.

## 2020-05-14 NOTE — TELEPHONE ENCOUNTER
Mom and patient calling back to say that he saw a provider about a year and a half ago named Francine Granados from [facility] and that is when it was changed to sertraline 150mg.

## 2020-05-14 NOTE — TELEPHONE ENCOUNTER
Call to to mom, per last office visit 3/12/20:  1. Major depressive disorder with single episode, in full remission (H)  Doing well, continue medication.  We discussed options of considering going off in the future once his life is more stable but suggest he might not want to try going off of it in the late fall or winter time.  He can contact me through my chart for directions on weaning off of it in the future otherwise follow-up in 1 year.  This can be a virtual visit.  - sertraline (ZOLOFT) 100 MG tablet; Take 1 tablet (100 mg) by mouth daily  Dispense: 90 tablet; Refill: 3      Per mom patient saw provider stated below and prescription was increased to 150mg and this is what patient has been taking for a year and a half. [facility]   INCREASE sertraline to 150 mg daily  Patient did not confirm this at office visit with primary care provider that this was the dose that he was taking so patient has been taking 1.5 tablets daily without looking at what was prescribed to him. Patient is now out of medication due to taking this dose and needs new prescription for correct dose. Medication pended with corrected dose, please sign if appropriate, patient is out of medication.     Thank you.

## 2020-07-26 DIAGNOSIS — L64.9 ANDROGENETIC ALOPECIA: ICD-10-CM

## 2020-07-30 RX ORDER — MINOXIDIL 2.5 MG/1
TABLET ORAL
Qty: 60 TABLET | Refills: 0 | Status: SHIPPED | OUTPATIENT
Start: 2020-07-30 | End: 2020-08-17

## 2020-07-30 NOTE — TELEPHONE ENCOUNTER
Received refill request for po minoxidil as the resident on call. Reviewed patient's chart and attached communication. Patient last seen 10/2019 for pattern loss. RTC as needed but patient subsequently initiated on minoxidil. Patient requires follow-up visit. Will fill for 2 mo to cover and forwarding to [facility] to facilitate schedule w/ Dr. Miller or Resident CC in 6-8 weeks.    After reviewing the medication list and assessment and plan from last visit, the refill request was accepted.    CC'ing Dr. Miller as GRETCHEN Boykin MD  Internal Medicine - Dermatology PGY 2  [phone]

## 2020-07-30 NOTE — TELEPHONE ENCOUNTER
MINOXIDIL 2.5 MG TABLET       Last Written Prescription Date:  1-28-20  Last Fill Quantity: 90,   # refills: 1  Last Office Visit : 10-1-19  Future Office visit:  none    Routing refill request to provider for review/approval because:  Med not on protocol

## 2020-08-17 ENCOUNTER — VIRTUAL VISIT (OUTPATIENT)
Dept: DERMATOLOGY | Facility: CLINIC | Age: 25
End: 2020-08-17
Payer: COMMERCIAL

## 2020-08-17 DIAGNOSIS — L64.9 ANDROGENETIC ALOPECIA: ICD-10-CM

## 2020-08-17 RX ORDER — MINOXIDIL 2.5 MG/1
2.5 TABLET ORAL DAILY
Qty: 120 TABLET | Refills: 2 | Status: SHIPPED | OUTPATIENT
Start: 2020-08-17 | End: 2021-10-07

## 2020-08-17 NOTE — LETTER
"8/17/2020       RE: Rohan Wilson  [address] [place] [address]     Dear Colleague,    Thank you for referring your patient, Rohan Wilson, to the [facility] DERMATOLOGY at [facility]. Please see a copy of my visit note below.    [facility] Dermatology Record:  Video: ( Invitation sent by:  [email or web address] waiting room )    Dermatology Problem List:  1. Pattern hair loss  -Current tx: Rogaine foam and/or liquid BID (patient does two separate applications at night), oral minoxidil 2.5mg daily  -Previous tx: finasteride - interacting with exogenous testosterone and therefore he discontinued    Encounter Date: Aug 17, 2020    CC:   Chief Complaint   Patient presents with     Hair Loss     Rohan is following up on AA.      History of Present Illness:  Rohan Wilson is a 25 year old adult who presents for pattern hair loss follow up. He is currently using Rogaine topically twice daily - but two separate applications at night. Taking oral minoxidil 2.5mg daily. Overall things are going fine. He notes things have not really changed, they have not gotten worse or better. He notes he stopped the propecia because there were some issues with the propecia and the exogenous testosterone that were making his hormones \"out of whack\". Notes he tried to work with prescribing physician, Dr. Gibson, to adjust the testosterone dosing, but it was not working, so he elected to discontinue it.  Not noticing hair loss that is more or less than it was previously. Basically he feels like things are very much stable. No sx on the scalp. No GI sx. Doing well otherwise. He has no other concerns today.     ROS: Patient is generally feeling well today   -GI: no nausea, abdominal pain, vomiting, diarrhea or blood in the stool  -Constitutional: no unintended weight loss/gain, no night sweats or fevers  -Skin: as per HPI    Physical Examination:  General: Well-appearing, appropriately-developed " individual.  Skin: Focused examination including face, neck and scalp was performed.   - frontal hair line maintained  - there is significant bi temporal loss however, unable to appreciate if this is any worse or better than previously. However looks a bit more significant that patient's identification photo - although I am not sure when this photo was taken. Patient will be submitting photos via [employer] to use in his file for comparison.     Past Medical History:   Patient Active Problem List   Diagnosis     Major depressive disorder, recurrent episode, in partial remission (H)     Female-to-male transgender person     CARDIOVASCULAR SCREENING; LDL GOAL LESS THAN 130     Past Medical History:   Diagnosis Date     Congenital dislocation of hip, unilateral birth    Congenital hip dislocation     Varicella without mention of complication     varicella (6 lesions-age 6 weeks)     Volume depletion     dehydration with C. Diff-1998, dehydration with Rotovirus-2000     Past Surgical History:   Procedure Laterality Date     ESOPHAGOSCOPY, GASTROSCOPY, DUODENOSCOPY (EGD), COMBINED  2/9/2014    Procedure: COMBINED ESOPHAGOSCOPY, GASTROSCOPY, DUODENOSCOPY (EGD), BIOPSY SINGLE OR MULTIPLE;  Esophagoscopy,Gastroscopy,Duodenoscopy- room 240 with stomach biopsies via cold forceps.;  Surgeon: Carmelo Tidwell MD;  Location:  GI     TRANSGENDER MASTECTOMY Bilateral 7/25/2016    Procedure: TRANSGENDER MASTECTOMY;  Surgeon: Rosario Resendiz MD;  Location:  OR     Social History:  Patient reports that he has never smoked. He has never used smokeless tobacco. He reports that he does not drink alcohol or use drugs.    Family History:  Family History   Problem Relation Age of Onset     Cancer Maternal Grandfather         breast     Cancer Paternal Grandmother         prostate     Neurologic Disorder Maternal Aunt         migraines     Neurologic Disorder Mother         migraines         Skin Cancer Mother         BCC      "Gastrointestinal Disease Maternal Aunt         crohns     Allergies Father         Food allergy     Medications:  Current Outpatient Medications   Medication     minoxidil (LONITEN) 2.5 MG tablet     sertraline (ZOLOFT) 100 MG tablet     Syringe/Needle, Disp, 23G X 1\" 1 ML MISC     testosterone cypionate (DEPOTESTOSTERONE) 200 MG/ML injection     No current facility-administered medications for this visit.      Allergies   Allergen Reactions     Amitriptyline-Perphenazine [Perphenazine-Amitriptyline] GI Disturbance     Pt developed an ulcer after taking med     Impression and Recommendations (Patient Counseled on the Following):  1. Pattern Hair Loss - Reviewed that this process is driven by testosterone  -Continue Rogaine nightly, two separate applications  -Continue oral minoxidil 2.5mg daily  -Asked patient to submit photos via [employer] from several angles so we can monitor progress over time  -laser light comb vs PRP injections as an alternative/adjunctive option in the future    Follow-up:   Follow-up with dermatology in approximately 1 year. Earlier for new or changing lesions or rash.   CC Dr. Poe on close of this encounter.      Staff only    All risks, benefits and alternatives were discussed with patient.  Patient is in agreement and understands the assessment and plan.  All questions were answered.    Zulay Canas PA-C, MPAS  [facility] [place]: Phone: [phone], Fax: [phone]  [facility]: Phone: [phone],  Fax: [phone]  _____________________________________________________________________________    Teledermatology information:  - Location of patient: [place]  - Patient presented as: return  - Location of teledermatologist:  ([facility] DERMATOLOGY )  - Reason teledermatology is appropriate:  of National Emergency Regarding Coronavirus disease (COVID 19) Outbreak  - Image quality and " interpretability: acceptable  - Physician has received verbal consent for a Video/Photos Visit from the patient? Yes  - In-person dermatology visit recommendation: no  - Date of images: 8/17/20  - Length of call: 12min  - Date of report: 8/17/2020     Again, thank you for allowing me to participate in the care of your patient.      Sincerely,    Zulay Canas PA-C

## 2020-08-17 NOTE — PATIENT INSTRUCTIONS
[facility] Dermatology Visit    Thank you for allowing us to participate in your care. Your findings, instructions and follow-up plan are as follows:    1. Pattern Hair Loss  -Continue Rogaine nightly, two separate applications  -Continue oral minoxidil 2.5mg daily  -Asked patient to submit photos via MyChart from several angles so we can monitor progress over time    When should I call my doctor?    If you are worsening or not improving, please, contact us or seek urgent care as noted below.     Who should I call with questions (adults)?    [facility] (adult and pediatric): [phone]     [facility] (adult): [phone]    For urgent needs outside of business hours call the [facility] at [phone] and ask for the dermatology resident on call    If this is a medical emergency and you are unable to reach an ER, Call [phone]      Who should I call with questions (pediatric)?  [facility]- Pediatric Dermatology  Dr. Tessa Peña, Dr. Karla Holbrook, Dr. Rajwinder Cottrell, Iliana Kate, PA  Dr. Linda Aviles, Dr. Gina Poe & Dr. Lloyd Wong  Non Urgent  Nurse Triage Line; [phone]- Marjorie and Rebecca MARAVILLA Care Coordinators   Jackelyn (/Complex ) [phone]    If you need a prescription refill, please contact your pharmacy. Refills are approved or denied by our Physicians during normal business hours, Monday through Fridays  Per office policy, refills will not be granted if you have not been seen within the past year (or sooner depending on your child's condition)    Scheduling Information:  Pediatric Appointment Scheduling and Call Center [phone]  Radiology Scheduling- [phone]  Sedation Unit Scheduling- [phone]  [place] Scheduling- General [phone]; Pediatric Dermatology [phone]  Main  Services: [phone]  Persian:  [phone]  Brazilian: [phone]  Karolyn/Yakut/Mateo: [phone]  Preadmission Nursing Department Fax Number: [phone] (Fax all pre-operative paperwork to this number)    For urgent matters arising during evenings, weekends, or holidays that cannot wait for normal business hours please call [phone] and ask for the Dermatology Resident On-Call to be paged.

## 2020-08-17 NOTE — PROGRESS NOTES
" [facility] Dermatology Record:  Video: ( Invitation sent by:  [employer] waiting room )    Dermatology Problem List:  1. Pattern hair loss  -Current tx: Rogaine foam and/or liquid BID (patient does two separate applications at night), oral minoxidil 2.5mg daily  -Previous tx: finasteride - interacting with exogenous testosterone and therefore he discontinued    Encounter Date: Aug 17, 2020    CC:   Chief Complaint   Patient presents with     Hair Loss     Rohan is following up on AA.      History of Present Illness:  Rohan Wilson is a 25 year old adult who presents for pattern hair loss follow up. He is currently using Rogaine topically twice daily - but two separate applications at night. Taking oral minoxidil 2.5mg daily. Overall things are going fine. He notes things have not really changed, they have not gotten worse or better. He notes he stopped the propecia because there were some issues with the propecia and the exogenous testosterone that were making his hormones \"out of whack\". Notes he tried to work with prescribing physician, Dr. Gibson, to adjust the testosterone dosing, but it was not working, so he elected to discontinue it.  Not noticing hair loss that is more or less than it was previously. Basically he feels like things are very much stable. No sx on the scalp. No GI sx. Doing well otherwise. He has no other concerns today.     ROS: Patient is generally feeling well today   -GI: no nausea, abdominal pain, vomiting, diarrhea or blood in the stool  -Constitutional: no unintended weight loss/gain, no night sweats or fevers  -Skin: as per HPI    Physical Examination:  General: Well-appearing, appropriately-developed individual.  Skin: Focused examination including face, neck and scalp was performed.   - frontal hair line maintained  - there is significant bi temporal loss however, unable to appreciate if this is any worse or better than previously. However looks a bit more significant that patient's " "identification photo - although I am not sure when this photo was taken. Patient will be submitting photos via [employer] to use in his file for comparison.     Past Medical History:   Patient Active Problem List   Diagnosis     Major depressive disorder, recurrent episode, in partial remission (H)     Female-to-male transgender person     CARDIOVASCULAR SCREENING; LDL GOAL LESS THAN 130     Past Medical History:   Diagnosis Date     Congenital dislocation of hip, unilateral birth    Congenital hip dislocation     Varicella without mention of complication     varicella (6 lesions-age 6 weeks)     Volume depletion     dehydration with C. Diff-1998, dehydration with Rotovirus-2000     Past Surgical History:   Procedure Laterality Date     ESOPHAGOSCOPY, GASTROSCOPY, DUODENOSCOPY (EGD), COMBINED  2/9/2014    Procedure: COMBINED ESOPHAGOSCOPY, GASTROSCOPY, DUODENOSCOPY (EGD), BIOPSY SINGLE OR MULTIPLE;  Esophagoscopy,Gastroscopy,Duodenoscopy- room 240 with stomach biopsies via cold forceps.;  Surgeon: Carmelo Tidwell MD;  Location: [facility] GI     TRANSGENDER MASTECTOMY Bilateral 7/25/2016    Procedure: TRANSGENDER MASTECTOMY;  Surgeon: Rosario Resendiz MD;  Location:  OR     Social History:  Patient reports that he has never smoked. He has never used smokeless tobacco. He reports that he does not drink alcohol or use drugs.    Family History:  Family History   Problem Relation Age of Onset     Cancer Maternal Grandfather         breast     Cancer Paternal Grandmother         prostate     Neurologic Disorder Maternal Aunt         migraines     Neurologic Disorder Mother         migraines         Skin Cancer Mother         BCC     Gastrointestinal Disease Maternal Aunt         crohns     Allergies Father         Food allergy     Medications:  Current Outpatient Medications   Medication     minoxidil (LONITEN) 2.5 MG tablet     sertraline (ZOLOFT) 100 MG tablet     Syringe/Needle, Disp, 23G X 1\" 1 ML MISC     " testosterone cypionate (DEPOTESTOSTERONE) 200 MG/ML injection     No current facility-administered medications for this visit.      Allergies   Allergen Reactions     Amitriptyline-Perphenazine [Perphenazine-Amitriptyline] GI Disturbance     Pt developed an ulcer after taking med     Impression and Recommendations (Patient Counseled on the Following):  1. Pattern Hair Loss - Reviewed that this process is driven by testosterone  -Continue Rogaine nightly, two separate applications  -Continue oral minoxidil 2.5mg daily  -Asked patient to submit photos via [employer] from several angles so we can monitor progress over time  -laser light comb vs PRP injections as an alternative/adjunctive option in the future    Follow-up:   Follow-up with dermatology in approximately 1 year. Earlier for new or changing lesions or rash.   CC Dr. Poe on close of this encounter.      Staff only    All risks, benefits and alternatives were discussed with patient.  Patient is in agreement and understands the assessment and plan.  All questions were answered.    Zulay Canas PA-C, MPAS  [facility] [place]: Phone: [phone], Fax: [phone]  [facility]: Phone: [phone],  Fax: [phone]  _____________________________________________________________________________    Teledermatology information:  - Location of patient: [place]  - Patient presented as: return  - Location of teledermatologist:  ([facility] DERMATOLOGY )  - Reason teledermatology is appropriate:  of National Emergency Regarding Coronavirus disease (COVID 19) Outbreak  - Image quality and interpretability: acceptable  - Physician has received verbal consent for a Video/Photos Visit from the patient? Yes  - In-person dermatology visit recommendation: no  - Date of images: 8/17/20  - Length of call: 12min  - Date of report: 8/17/2020

## 2020-08-17 NOTE — NURSING NOTE
Dermatology Rooming Note    Rohan Wilson's goals for this visit include:   Chief Complaint   Patient presents with     Hair Loss     Rohan is following up on AA.      SUDEEP Walker

## 2020-10-31 ENCOUNTER — ANCILLARY PROCEDURE (OUTPATIENT)
Dept: GENERAL RADIOLOGY | Facility: CLINIC | Age: 25
End: 2020-10-31
Attending: FAMILY MEDICINE
Payer: COMMERCIAL

## 2020-10-31 ENCOUNTER — OFFICE VISIT (OUTPATIENT)
Dept: URGENT CARE | Facility: URGENT CARE | Age: 25
End: 2020-10-31
Payer: COMMERCIAL

## 2020-10-31 VITALS
OXYGEN SATURATION: 98 % | SYSTOLIC BLOOD PRESSURE: 98 MMHG | WEIGHT: 162 LBS | RESPIRATION RATE: 12 BRPM | BODY MASS INDEX: 23.92 KG/M2 | TEMPERATURE: 98 F | DIASTOLIC BLOOD PRESSURE: 50 MMHG | HEART RATE: 72 BPM

## 2020-10-31 DIAGNOSIS — R10.31 ABDOMINAL PAIN, RIGHT LOWER QUADRANT: Primary | ICD-10-CM

## 2020-10-31 LAB
ALBUMIN SERPL-MCNC: 3.9 G/DL (ref 3.4–5)
ALBUMIN UR-MCNC: NEGATIVE MG/DL
ALP SERPL-CCNC: 48 U/L (ref 40–150)
ALT SERPL W P-5'-P-CCNC: 26 U/L (ref 0–70)
ANION GAP SERPL CALCULATED.3IONS-SCNC: 1 MMOL/L (ref 3–14)
APPEARANCE UR: CLEAR
AST SERPL W P-5'-P-CCNC: 33 U/L (ref 0–45)
BASOPHILS # BLD AUTO: 0 10E9/L (ref 0–0.2)
BASOPHILS NFR BLD AUTO: 0.1 %
BILIRUB SERPL-MCNC: 1.1 MG/DL (ref 0.2–1.3)
BILIRUB UR QL STRIP: NEGATIVE
BUN SERPL-MCNC: 17 MG/DL (ref 7–30)
CALCIUM SERPL-MCNC: 9.5 MG/DL (ref 8.5–10.1)
CHLORIDE SERPL-SCNC: 105 MMOL/L (ref 94–109)
CO2 SERPL-SCNC: 34 MMOL/L (ref 20–32)
COLOR UR AUTO: YELLOW
CREAT SERPL-MCNC: 0.9 MG/DL (ref 0.66–1.25)
DIFFERENTIAL METHOD BLD: NORMAL
EOSINOPHIL # BLD AUTO: 0.1 10E9/L (ref 0–0.7)
EOSINOPHIL NFR BLD AUTO: 0.7 %
ERYTHROCYTE [DISTWIDTH] IN BLOOD BY AUTOMATED COUNT: 11.4 % (ref 10–15)
GFR SERPL CREATININE-BSD FRML MDRD: >90 ML/MIN/{1.73_M2}
GLUCOSE SERPL-MCNC: 81 MG/DL (ref 70–99)
GLUCOSE UR STRIP-MCNC: NEGATIVE MG/DL
HCT VFR BLD AUTO: 43.7 % (ref 40–53)
HGB BLD-MCNC: 14.7 G/DL (ref 13.3–17.7)
HGB UR QL STRIP: NEGATIVE
KETONES UR STRIP-MCNC: NEGATIVE MG/DL
LEUKOCYTE ESTERASE UR QL STRIP: NEGATIVE
LYMPHOCYTES # BLD AUTO: 1.6 10E9/L (ref 0.8–5.3)
LYMPHOCYTES NFR BLD AUTO: 21.5 %
MCH RBC QN AUTO: 33 PG (ref 26.5–33)
MCHC RBC AUTO-ENTMCNC: 33.6 G/DL (ref 31.5–36.5)
MCV RBC AUTO: 98 FL (ref 78–100)
MONOCYTES # BLD AUTO: 0.8 10E9/L (ref 0–1.3)
MONOCYTES NFR BLD AUTO: 11.1 %
NEUTROPHILS # BLD AUTO: 5 10E9/L (ref 1.6–8.3)
NEUTROPHILS NFR BLD AUTO: 66.6 %
NITRATE UR QL: NEGATIVE
PH UR STRIP: 7 PH (ref 5–7)
PLATELET # BLD AUTO: 190 10E9/L (ref 150–450)
POTASSIUM SERPL-SCNC: 4.6 MMOL/L (ref 3.4–5.3)
PROT SERPL-MCNC: 6.9 G/DL (ref 6.8–8.8)
RBC # BLD AUTO: 4.45 10E12/L (ref 4.4–5.9)
SODIUM SERPL-SCNC: 140 MMOL/L (ref 133–144)
SOURCE: NORMAL
SP GR UR STRIP: 1.02 (ref 1–1.03)
UROBILINOGEN UR STRIP-ACNC: 0.2 EU/DL (ref 0.2–1)
WBC # BLD AUTO: 7.5 10E9/L (ref 4–11)

## 2020-10-31 PROCEDURE — 80053 COMPREHEN METABOLIC PANEL: CPT | Performed by: FAMILY MEDICINE

## 2020-10-31 PROCEDURE — 81003 URINALYSIS AUTO W/O SCOPE: CPT | Performed by: FAMILY MEDICINE

## 2020-10-31 PROCEDURE — 74019 RADEX ABDOMEN 2 VIEWS: CPT | Performed by: RADIOLOGY

## 2020-10-31 PROCEDURE — 99214 OFFICE O/P EST MOD 30 MIN: CPT | Performed by: FAMILY MEDICINE

## 2020-10-31 PROCEDURE — 85025 COMPLETE CBC W/AUTO DIFF WBC: CPT | Performed by: FAMILY MEDICINE

## 2020-10-31 PROCEDURE — 36415 COLL VENOUS BLD VENIPUNCTURE: CPT | Performed by: FAMILY MEDICINE

## 2020-10-31 NOTE — NURSING NOTE
"Chief Complaint   Patient presents with     Urgent Care     Abdominal Pain     Pain in right lower abdomen for a couple of weeks.  The pain comes and goes.  Initially he thought it was a muscle pain but he's wondering if its something else now.  He rates the pain as 6/10 at its worst.  He has not had any n/v or fever.       Initial BP 98/50 (BP Location: Right arm, Patient Position: Sitting, Cuff Size: Adult Regular)   Pulse 72   Temp 98  F (36.7  C) (Tympanic)   Resp 12   Wt 73.5 kg (162 lb)   SpO2 98%   BMI 23.92 kg/m   Estimated body mass index is 23.92 kg/m  as calculated from the following:    Height as of 3/12/20: 1.753 m (5' 9\").    Weight as of this encounter: 73.5 kg (162 lb)..  BP completed using cuff size: regular  Alicia Perez R.N.    "

## 2020-10-31 NOTE — PROGRESS NOTES
"SUBJECTIVE  HPI:  Rohan Wilson is a 25 year old adult who presents with the CC of abdominal pain.    Symptoms present for the past 3 weeks, intermittent.  Notice more pain after eating or more activity.  Will last for couple of hours and resolves on its own, would occur every few days.  Over the past 3 days has been more constant, fluctuating in intensity.  Patient has not tried anything.    Has BM daily, has BM this morning and was normal.  Endorsed more discomfort with BM.  No dysuria or frequency.    Patient still has appendix.    Past Medical History:   Diagnosis Date     Congenital dislocation of hip, unilateral birth    Congenital hip dislocation     Varicella without mention of complication     varicella (6 lesions-age 6 weeks)     Volume depletion     dehydration with C. Diff-1998, dehydration with Rotovirus-2000     Current Outpatient Medications   Medication Sig Dispense Refill     minoxidil (LONITEN) 2.5 MG tablet Take 1 tablet (2.5 mg) by mouth daily 120 tablet 2     sertraline (ZOLOFT) 100 MG tablet Take 1.5 tablets (150 mg) by mouth daily 135 tablet 2     testosterone cypionate (DEPOTESTOSTERONE) 200 MG/ML injection Inject 0.3 mLs (60 mg) into the muscle once a week 4 mL 2     Syringe/Needle, Disp, 23G X 1\" 1 ML MISC To use with weekly IM injection 50 each 3     Social History     Tobacco Use     Smoking status: Never Smoker     Smokeless tobacco: Never Used     Tobacco comment: no smokers in the home   Substance Use Topics     Alcohol use: No     Alcohol/week: 0.0 standard drinks       ROS:  Review of systems negative except as stated above.    OBJECTIVE:  BP 98/50 (BP Location: Right arm, Patient Position: Sitting, Cuff Size: Adult Regular)   Pulse 72   Temp 98  F (36.7  C) (Tympanic)   Resp 12   Wt 73.5 kg (162 lb)   SpO2 98%   BMI 23.92 kg/m    GENERAL APPEARANCE: healthy, alert and no distress  EYES: EOMI,  PERRL, conjunctiva clear  ABDOMEN:  soft, RLQ tenderness  PSYCH: mentation " appears normal and affect normal/bright    XRAY - abdomen - moderate stool, scattered bowel gas.  No air-fluid level, no free air personally viewed by me    Results for orders placed or performed in visit on 10/31/20   XR Abdomen 2 Views     Status: None (Preliminary result)    Narrative    ABDOMEN TWO-THREE VIEW  10/31/2020 1:07 PM     HISTORY: Abdominal pain right lower quadrant x 3 weeks.    COMPARISON: February 9, 2014    FINDINGS: Moderate to large amount of stool. No free air. There are no  air filled distended loops of small bowel. The colon is not distended.  The lung bases are unremarkable.      Impression    IMPRESSION: Nonobstructed bowel gas pattern.   CBC with platelets and differential     Status: None   Result Value Ref Range    WBC 7.5 4.0 - 11.0 10e9/L    RBC Count 4.45 4.4 - 5.9 10e12/L    Hemoglobin 14.7 13.3 - 17.7 g/dL    Hematocrit 43.7 40.0 - 53.0 %    MCV 98 78 - 100 fl    MCH 33.0 26.5 - 33.0 pg    MCHC 33.6 31.5 - 36.5 g/dL    RDW 11.4 10.0 - 15.0 %    Platelet Count 190 150 - 450 10e9/L    Diff Method Automated Method     % Neutrophils 66.6 %    % Lymphocytes 21.5 %    % Monocytes 11.1 %    % Eosinophils 0.7 %    % Basophils 0.1 %    Absolute Neutrophil 5.0 1.6 - 8.3 10e9/L    Absolute Lymphocytes 1.6 0.8 - 5.3 10e9/L    Absolute Monocytes 0.8 0.0 - 1.3 10e9/L    Absolute Eosinophils 0.1 0.0 - 0.7 10e9/L    Absolute Basophils 0.0 0.0 - 0.2 10e9/L   Comprehensive metabolic panel     Status: Abnormal   Result Value Ref Range    Sodium 140 133 - 144 mmol/L    Potassium 4.6 3.4 - 5.3 mmol/L    Chloride 105 94 - 109 mmol/L    Carbon Dioxide 34 (H) 20 - 32 mmol/L    Anion Gap 1 (L) 3 - 14 mmol/L    Glucose 81 70 - 99 mg/dL    Urea Nitrogen 17 7 - 30 mg/dL    Creatinine 0.90 0.66 - 1.25 mg/dL    GFR Estimate >90 >60 mL/min/[1.73_m2]    GFR Estimate If Black >90 >60 mL/min/[1.73_m2]    Calcium 9.5 8.5 - 10.1 mg/dL    Bilirubin Total 1.1 0.2 - 1.3 mg/dL    Albumin 3.9 3.4 - 5.0 g/dL    Protein  Total 6.9 6.8 - 8.8 g/dL    Alkaline Phosphatase 48 40 - 150 U/L    ALT 26 0 - 70 U/L    AST 33 0 - 45 U/L   *UA reflex to Microscopic and Culture ([facility] (except [place] and [place])     Status: None    Specimen: Midstream Urine   Result Value Ref Range    Color Urine Yellow     Appearance Urine Clear     Glucose Urine Negative NEG^Negative mg/dL    Bilirubin Urine Negative NEG^Negative    Ketones Urine Negative NEG^Negative mg/dL    Specific Gravity Urine 1.020 1.003 - 1.035    Blood Urine Negative NEG^Negative    pH Urine 7.0 5.0 - 7.0 pH    Protein Albumin Urine Negative NEG^Negative mg/dL    Urobilinogen Urine 0.2 0.2 - 1.0 EU/dL    Nitrite Urine Negative NEG^Negative    Leukocyte Esterase Urine Negative NEG^Negative    Source Midstream Urine        ASSESSMENT/PLAN:  (R10.31) Abdominal pain, right lower quadrant  (primary encounter diagnosis)  Plan: CBC with platelets and differential,         Comprehensive metabolic panel, *UA reflex to         Microscopic and Culture ([place] and [place]         [facility] (except [place] and [place]), XR         Abdomen 2 Views            Patient appears well, no acute distress and no concerns for acute surgical abdomen.  Reviewed labs and Xray, reassurance given and discussed abdominal pain possible etiology most likely due to gas and stool bulk/constipation.  Encourage to increase fluids, fruits and veggies to help with BM.  Okay to try gas-X to help relieve gas discomfort.  Okay for miralax if desires.     Follow up with primary provider if no improvement of symptoms in 1 week    Nilson Stuart MD  October 31, 2020 2:32 PM

## 2021-01-15 ENCOUNTER — HEALTH MAINTENANCE LETTER (OUTPATIENT)
Age: 26
End: 2021-01-15

## 2021-02-07 DIAGNOSIS — F32.5 MAJOR DEPRESSIVE DISORDER WITH SINGLE EPISODE, IN FULL REMISSION (H): ICD-10-CM

## 2021-02-08 RX ORDER — SERTRALINE HYDROCHLORIDE 100 MG/1
TABLET, FILM COATED ORAL
Qty: 135 TABLET | Refills: 0 | Status: SHIPPED | OUTPATIENT
Start: 2021-02-08 | End: 2021-05-10

## 2021-05-08 DIAGNOSIS — F32.5 MAJOR DEPRESSIVE DISORDER WITH SINGLE EPISODE, IN FULL REMISSION (H): ICD-10-CM

## 2021-05-10 RX ORDER — SERTRALINE HYDROCHLORIDE 100 MG/1
TABLET, FILM COATED ORAL
Qty: 135 TABLET | Refills: 0 | Status: SHIPPED | OUTPATIENT
Start: 2021-05-10 | End: 2021-08-17

## 2021-05-10 NOTE — TELEPHONE ENCOUNTER
Medication is being filled for 1 time refill only due to:  Patient needs to be seen because due for follow up.  Over one year since last appt. .

## 2021-08-08 DIAGNOSIS — F32.5 MAJOR DEPRESSIVE DISORDER WITH SINGLE EPISODE, IN FULL REMISSION (H): ICD-10-CM

## 2021-08-10 ENCOUNTER — MYC MEDICAL ADVICE (OUTPATIENT)
Dept: INTERNAL MEDICINE | Facility: CLINIC | Age: 26
End: 2021-08-10

## 2021-08-17 RX ORDER — SERTRALINE HYDROCHLORIDE 100 MG/1
150 TABLET, FILM COATED ORAL DAILY
Qty: 45 TABLET | Refills: 0 | Status: SHIPPED | OUTPATIENT
Start: 2021-08-17 | End: 2021-09-10

## 2021-09-09 DIAGNOSIS — F32.5 MAJOR DEPRESSIVE DISORDER WITH SINGLE EPISODE, IN FULL REMISSION (H): ICD-10-CM

## 2021-09-09 NOTE — TELEPHONE ENCOUNTER
Pending Prescriptions:                       Disp   Refills    sertraline (ZOLOFT) 100 MG tablet [Pharmac*45 tab*0        Sig: TAKE 1.5 TABLETS BY MOUTH DAILY  Routing refill request to provider for review/approval because:  Fails protocol             Returned to room form MRI, not completed as unable to receive ordered sedation. Shante Green NP informed.   Detail Level: Detailed Render Risk Assessment In Note?: no Additional Notes: Recommended using otc anti fungal cream and powder or spray for shoes.

## 2021-09-10 RX ORDER — SERTRALINE HYDROCHLORIDE 100 MG/1
TABLET, FILM COATED ORAL
Qty: 45 TABLET | Refills: 0 | Status: SHIPPED | OUTPATIENT
Start: 2021-09-10 | End: 2021-09-13

## 2021-09-13 ENCOUNTER — VIRTUAL VISIT (OUTPATIENT)
Dept: INTERNAL MEDICINE | Facility: CLINIC | Age: 26
End: 2021-09-13
Payer: COMMERCIAL

## 2021-09-13 DIAGNOSIS — F32.5 MAJOR DEPRESSIVE DISORDER WITH SINGLE EPISODE, IN FULL REMISSION (H): Primary | ICD-10-CM

## 2021-09-13 DIAGNOSIS — G43.909 MIGRAINE WITHOUT STATUS MIGRAINOSUS, NOT INTRACTABLE, UNSPECIFIED MIGRAINE TYPE: ICD-10-CM

## 2021-09-13 PROCEDURE — 99213 OFFICE O/P EST LOW 20 MIN: CPT | Mod: GT | Performed by: INTERNAL MEDICINE

## 2021-09-13 ASSESSMENT — ANXIETY QUESTIONNAIRES
7. FEELING AFRAID AS IF SOMETHING AWFUL MIGHT HAPPEN: NOT AT ALL
IF YOU CHECKED OFF ANY PROBLEMS ON THIS QUESTIONNAIRE, HOW DIFFICULT HAVE THESE PROBLEMS MADE IT FOR YOU TO DO YOUR WORK, TAKE CARE OF THINGS AT HOME, OR GET ALONG WITH OTHER PEOPLE: NOT DIFFICULT AT ALL
2. NOT BEING ABLE TO STOP OR CONTROL WORRYING: NOT AT ALL
6. BECOMING EASILY ANNOYED OR IRRITABLE: SEVERAL DAYS
5. BEING SO RESTLESS THAT IT IS HARD TO SIT STILL: SEVERAL DAYS
GAD7 TOTAL SCORE: 3
1. FEELING NERVOUS, ANXIOUS, OR ON EDGE: NOT AT ALL
3. WORRYING TOO MUCH ABOUT DIFFERENT THINGS: NOT AT ALL

## 2021-09-13 ASSESSMENT — PATIENT HEALTH QUESTIONNAIRE - PHQ9
5. POOR APPETITE OR OVEREATING: SEVERAL DAYS
SUM OF ALL RESPONSES TO PHQ QUESTIONS 1-9: 4

## 2021-09-13 NOTE — PROGRESS NOTES
Rohan is a 26 year old who is being evaluated via a billable video visit.      How would you like to obtain your AVS? MyChart  If the video visit is dropped, the invitation should be resent by: Text to cell phone: [phone]  Will anyone else be joining your video visit? No    Video Start Time: 5:15    Assessment & Plan     Major depressive disorder with single episode, in full remission (H)  Stable, controlled depression symptoms.  Recommend continue current dose, refilled medication for another year.  - sertraline (ZOLOFT) 100 MG tablet; Take 1.5 tablets (150 mg) by mouth daily    Migraine without status migrainosus, not intractable, unspecified migraine type  Infrequent migraines, over-the-counter medications are helping.  Could consider prescription medication if over-the-counter does not help any more they become more frequent.    No follow-ups on file.    Rubia Sandoval MD  [facility]    Irving Madrigal is a 26 year old who presents for the following health issues     HPI     Depression  Follow-Up    How are you doing with your depression since your last visit? No change    Are you having other symptoms that might be associated with depression or anxiety? No    Have you had a significant life event?  He does report that he did move and get a new job so little bit of stress with those but coping well.    Do you have any concerns with your use of alcohol or other drugs? No      PHQ 9/15/2018 3/12/2020 9/13/2021   PHQ-9 Total Score 1 4 4   Q9: Thoughts of better off dead/self-harm past 2 weeks Not at all Not at all Not at all     TENZIN-7 SCORE 7/8/2016 11/3/2016 9/13/2021   Total Score 0 1 3   Total Score - - -         How many servings of fruits and vegetables do you eat daily?  2-3    On average, how many sweetened beverages do you drink each day (Examples: soda, juice, sweet tea, etc.  Do NOT count diet or artificially sweetened beverages)?   0    How many days per week do you  "exercise enough to make your heart beat faster? 6    How many minutes a day do you exercise enough to make your heart beat faster? 30 - 60    How many days per week do you miss taking your medication? 0      Other problems:   1.  Migraine headaches: Has a migraine about every other week, uses over-the-counter, rest.  They are not severe.  2.  Transgender: On treatment with testosterone and minoxidil, following with gender clinic.    Current Concerns:   none    Patient Active Problem List   Diagnosis     Major depressive disorder, recurrent episode, in partial remission (H)     Female-to-male transgender person     CARDIOVASCULAR SCREENING; LDL GOAL LESS THAN 130     Migraine without status migrainosus, not intractable, unspecified migraine type       Current Outpatient Medications   Medication Sig Dispense Refill     minoxidil (LONITEN) 2.5 MG tablet Take 1 tablet (2.5 mg) by mouth daily 120 tablet 2     sertraline (ZOLOFT) 100 MG tablet TAKE 1.5 TABLETS BY MOUTH DAILY 45 tablet 0     Syringe/Needle, Disp, 23G X 1\" 1 ML MISC To use with weekly IM injection 50 each 3     testosterone cypionate (DEPOTESTOSTERONE) 200 MG/ML injection Inject 0.3 mLs (60 mg) into the muscle once a week 4 mL 2         Social History     Tobacco Use     Smoking status: Never Smoker     Smokeless tobacco: Never Used     Tobacco comment: no smokers in the home   Substance Use Topics     Alcohol use: No     Alcohol/week: 0.0 standard drinks            Review of Systems   Negative      Objective           Vitals:  No vitals were obtained today due to virtual visit.    Physical Exam   GENERAL: Healthy, alert and no distress  EYES: Eyes grossly normal to inspection.  No discharge or erythema, or obvious scleral/conjunctival abnormalities.  RESP: No audible wheeze, cough, or visible cyanosis.  No visible retractions or increased work of breathing.    SKIN: Visible skin clear. No significant rash, abnormal pigmentation or lesions.  NEURO: Cranial " nerves grossly intact.  Mentation and speech appropriate for age.  PSYCH: Mentation appears normal, affect normal/bright, judgement and insight intact, normal speech and appearance well-groomed.    PHQ 9/15/2018 3/12/2020 9/13/2021   PHQ-9 Total Score 1 4 4   Q9: Thoughts of better off dead/self-harm past 2 weeks Not at all Not at all Not at all                 Video-Visit Details    Type of service:  Video Visit    Video End Time:5:24    Originating Location (pt. Location): Home    Distant Location (provider location):  [facility]     Platform used for Video Visit: Damien

## 2021-09-14 ASSESSMENT — ANXIETY QUESTIONNAIRES: GAD7 TOTAL SCORE: 3

## 2021-09-18 ENCOUNTER — OFFICE VISIT (OUTPATIENT)
Dept: URGENT CARE | Facility: URGENT CARE | Age: 26
End: 2021-09-18
Payer: COMMERCIAL

## 2021-09-18 VITALS
HEART RATE: 61 BPM | SYSTOLIC BLOOD PRESSURE: 104 MMHG | OXYGEN SATURATION: 99 % | DIASTOLIC BLOOD PRESSURE: 60 MMHG | TEMPERATURE: 97.6 F | BODY MASS INDEX: 23.63 KG/M2 | WEIGHT: 160 LBS

## 2021-09-18 DIAGNOSIS — R07.0 THROAT PAIN: Primary | ICD-10-CM

## 2021-09-18 LAB — DEPRECATED S PYO AG THROAT QL EIA: NEGATIVE

## 2021-09-18 PROCEDURE — 87651 STREP A DNA AMP PROBE: CPT | Performed by: PHYSICIAN ASSISTANT

## 2021-09-18 PROCEDURE — 99213 OFFICE O/P EST LOW 20 MIN: CPT | Performed by: PHYSICIAN ASSISTANT

## 2021-09-18 NOTE — PROGRESS NOTES
Assessment & Plan     Throat pain  Rapid strep is negative today.  Throat culture is pending.  Supportive care measures advised.  We will communicate any positive finding on the throat culture result. Symptomatic Covid PCR swab done in clinic today.  Awaiting COVID-19 PCR result. If result return positive will need to self quarantine for 10 days or until 24 hrs after symptoms resolve (whichever comes first). Follow-up if any worsening symptoms.  Patient understands and agrees with the plan.    - Streptococcus A Rapid Screen w/Reflex to PCR - Clinic Collect  - Symptomatic COVID-19 Virus (Coronavirus) by PCR  - Group A Streptococcus PCR Throat Swab         Return in about 1 week (around 9/25/2021) for Symptoms failing to improve.    Tia Gould PA-C  [facility] Madrigal is a 26 year old adult who presents to clinic today for the following health issues:  Chief Complaint   Patient presents with     Urgent Care     Pharyngitis     sore throat began yesterday      HPI      Pharyngitis    Onset of symptoms was 1 day(s) ago.  Course of illness is worsening.    Severity moderate  Current and Associated symptoms: sore throat  Denies fever/chills/cough/n/v/d  Treatment measures tried include None tried.  Predisposing factors include None.  No obvious exposure to anyone with strep or Covid.      Review of Systems  Constitutional, HEENT, cardiovascular, pulmonary, GI, , musculoskeletal, neuro, skin, endocrine and psych systems are negative, except as otherwise noted.      Objective    /60 (BP Location: Right arm)   Pulse 61   Temp 97.6  F (36.4  C) (Tympanic)   Wt 72.6 kg (160 lb)   SpO2 99%   BMI 23.63 kg/m    Physical Exam   GENERAL: healthy, alert and no distress  HENT: ear canals and TM's normal,  mouth without ulcers or lesions, throat is moist and pink  RESP: lungs clear to auscultation - no rales, rhonchi or wheezes  CV: regular rate and rhythm, normal S1  S2    Results for orders placed or performed in visit on 09/18/21 (from the past 24 hour(s))   Streptococcus A Rapid Screen w/Reflex to PCR - Clinic Collect    Specimen: Throat; Swab   Result Value Ref Range    Group A Strep antigen Negative Negative

## 2021-09-18 NOTE — PATIENT INSTRUCTIONS
Patient Education     Self-Care for Sore Throats     Sore throats happen for many reasons, such as colds, allergies, cigarette smoke, air pollution, and infections caused by viruses or bacteria. In any case, your throat becomes red and sore. Your goal for self-care is to reduce your discomfort while giving your throat a chance to heal.  Moisten and soothe your throat  Tips include the following:    Try a sip of water first thing after waking up.    Keep your throat moist by drinking 6 or more glasses of clear liquids every day.    Run a cool-air humidifier in your room overnight.    Stay away from cigarette smoke.     Check the air quality index,if air pollution gives you a sore throat. On high pollution days, try to limit outdoor time.    Suck on throat lozenges, cough drops, hard candy, ice chips, or frozen fruit-juice bars. Use the sugar-free versions if your diet or medical condition requires them.  Gargle to ease irritation  Gargling every hour or 2 can ease irritation. Try gargling with 1 of these solutions:    1/4 teaspoon of salt in 1/2 cup of warm water    An over-the-counter anesthetic gargle  Use medicine for more relief  Over-the-counter medicine can reduce sore throat symptoms. Ask your pharmacist if you have questions about which medicine to use. To prevent possible medicine interactions, let the pharmacist know what medicines you take. To decrease symptoms:    Ease pain with anesthetic sprays. Aspirin or an aspirin substitute also helps. Remember, never give aspirin to anyone 18 or younger. Don't take aspirin if you are already taking blood thinners.     For sore throats caused by allergies, try antihistamines to block the allergic reaction.  Unless a sore throat is caused by a bacterial infection, antibiotics won t help you.  Prevent future sore throats  Prevention tips include:    Stop smoking or reduce contact with secondhand smoke. Smoke irritates the tender throat lining.    Limit contact with  pets and with allergy-causing substances, such as pollen and mold.    Wash your hands often when you re around someone with a sore throat or cold. This will keep viruses or bacteria from spreading.    Limit outdoor time when air pollution is bad.    Don t strain your vocal cords.  When to call your healthcare provider  Contact your healthcare provider if you have:    Fever of 100.4 F (38.0 C) or higher, or as directed by your healthcare provider    White spots on the throat    Great Trouble swallowing    A skin rash    Recent exposure to someone else with strep bacteria    Severe hoarseness and swollen glands in the neck or jaw  Call 911  Call 911 if any of the following occur:    Trouble breathing or catching your breath    Drooling and problems swallowing    Wheezing    Unable to talk    Feeling dizzy or faint    Feeling of doom  [employer] last reviewed this educational content on 9/1/2019 2000-2021 [employer]. All rights reserved. This information is not intended as a substitute for professional medical care. Always follow your healthcare professional's instructions.           Patient Education   After Your COVID-19 (Coronavirus) Test  You have been tested for COVID-19 (coronavirus).   If you'll have surgery in the next few days, we'll let you know ahead of time if you have the virus. Please call your surgeon's office with any questions.  For all other patients: Results are usually available in Audioscribe within 2 to 3 days.   If you do not have a Audioscribe account, you'll get a letter in the mail in about 7 to 10 days.   Thrasost is often the fastest way to get test results. Please sign up if you do not already have a Audioscribe account. See the handout Getting COVID-19 Test Results in Audioscribe for help.  What if my test result is positive?  If your test is positive and you have not viewed your result in Thrasost, you'll get a phone call with your result. (A positive test means that you have the virus.)  "    Follow the tips under \"How do I self-isolate?\" below for 10 days (20 days if you have a weak immune system).    You don't need to be retested for COVID-19 before going back to school or work. As long as you're fever-free and feeling better, you can go back to school, work and other activities after waiting the 10 or 20 days.  What if I have questions after I get my results?  If you have questions about your results, please visit our testing website at [email or web address].   After 7 to 10 days, if you have not gotten your results:     Call [phone] ([phone]) and ask to speak with our COVID-19 results team.    If you're being treated at an infusion center: Call your infusion center directly.  What are the symptoms of COVID-19?  Cough, fever and trouble breathing are the most common signs of COVID-19.  Other symptoms can include new headaches, new muscle or body aches, new and unexplained fatigue (feeling very tired), chills, sore throat, congestion (stuffy or runny nose), diarrhea (loose poop), loss of taste or smell, belly pain, and nausea or vomiting (feeling sick to your stomach or throwing up).  You may already have symptoms of COVID-19, or they may show up later.  What should I do if I have symptoms?  If you're having surgery: Call your surgeon's office.  For all other patients: Stay home and away from others (self-isolate) until ...    You've had no fever--and no medicine that reduces fever--for 1 full day (24 hours), AND    Other symptoms have gotten better. For example, your cough or breathing has improved, AND    At least 10 days have passed since your symptoms first started.  How do I self-isolate?    Stay in your own room, even for meals. Use your own bathroom if you can.    Stay away from others in your home. No hugging, kissing or shaking hands. No visitors.    Don't go to work, school or anywhere else.    Clean \"high touch\" surfaces often (doorknobs, " counters, handles). Use household cleaning spray or wipes. You'll find a full list of  on the EPA website: www.epa.gov/pesticide-registration/list-n-disinfectants-use-against-sars-cov-2.    Cover your mouth and nose with a mask or other face covering to avoid spreading germs.    Wash your hands and face often. Use soap and water.    Caregivers in these groups are at risk for severe illness due to COVID-19:  ? People 65 years and older  ? People who live in a nursing home or long-term care facility  ? People with chronic disease (lung, heart, cancer, diabetes, kidney, liver, immunologic)  ? People who have a weakened immune system, including those who:    Are in cancer treatment    Take medicine that weakens the immune system, such as corticosteroids    Had a bone marrow or organ transplant    Have an immune deficiency    Have poorly controlled HIV or AIDS    Are obese (body mass index of 40 or higher)    Smoke regularly    Caregivers should wear gloves while washing dishes, handling laundry and cleaning bedrooms and bathrooms.    Use caution when washing and drying laundry: Don't shake dirty laundry and use the warmest water setting that you can.    For more tips on managing your health at home, go to www.cdc.gov/coronavirus/2019-ncov/downloads/10Things.pdf.  How can I take care of myself at home?  1. Get lots of rest. Drink extra fluids (unless a doctor has told you not to).  2. Take Tylenol (acetaminophen) for fever or pain. If you have liver or kidney problems, ask your family doctor if it's OK to take Tylenol.   Adults can take either:  ? 650 mg (two 325 mg pills) every 4 to 6 hours, or   ? 1,000 mg (two 500 mg pills) every 8 hours as needed.  ? Note: Don't take more than 3,000 mg in one day. Acetaminophen is found in many medicines (both prescribed and over-the-counter medicines). Read all labels to be sure you don't take too much.   For children, check the Tylenol bottle for the right dose. The dose is  based on the child's age or weight.  3. If you have other health problems (like cancer, heart failure, an organ transplant or severe kidney disease): Call your specialty clinic if you don't feel better in the next 2 days.  4. Know when to call 911. Emergency warning signs include:  ? Trouble breathing or shortness of breath  ? Chest pain or pressure that doesn't go away  ? Feeling confused like you haven't felt before, or not being able to wake up  ? Bluish-colored lips or face  5. If your doctor prescribed a blood thinner medicine: Follow their instructions.  Where can I get more information?    [facility] - About COVID-19:   [email or web address]    CDC - If You're Sick: cdc.gov/coronavirus/2019-ncov/about/steps-when-sick.html    CDC - Ending Home Isolation: www.cdc.gov/coronavirus/2019-ncov/hcp/disposition-in-home-patients.html    CDC - Caring for Someone: www.cdc.gov/coronavirus/2019-ncov/if-you-are-sick/care-for-someone.html    [facility] - Interim Guidance for Hospital Discharge to Home: www.health.[facility].[place]/diseases/coronavirus/hcp/hospdischarge.pdf    [facility] clinical trials (COVID-19 research studies): [email or web address].[email or web address]-clinical-trials    Below are the COVID-19 hotlines at the [place] Department of Health ([facility]). Interpreters are available.  ? For health questions: Call 251-236-6472 or 1-426.790.4747 (7 a.m. to 7 p.m.)  ? For questions about schools and childcare: Call 036-775-7901 or 1-444.478.4902 (7 a.m. to 7 p.m.)    For informational purposes only. Not to replace the advice of your health care provider. Clinically reviewed by Infection Prevention and the [facility] COVID-19 Clinical Team. Copyright   2020 [employer]. All rights reserved. [employer] [id number] - Rev 11/11/20.

## 2021-09-19 LAB — GROUP A STREP BY PCR: NOT DETECTED

## 2021-09-20 PROBLEM — G43.909 MIGRAINE WITHOUT STATUS MIGRAINOSUS, NOT INTRACTABLE, UNSPECIFIED MIGRAINE TYPE: Status: ACTIVE | Noted: 2021-09-20

## 2021-09-20 RX ORDER — SERTRALINE HYDROCHLORIDE 100 MG/1
150 TABLET, FILM COATED ORAL DAILY
Qty: 135 TABLET | Refills: 3 | Status: SHIPPED | OUTPATIENT
Start: 2021-09-20 | End: 2022-10-18

## 2021-09-22 ENCOUNTER — VIRTUAL VISIT (OUTPATIENT)
Dept: URGENT CARE | Facility: CLINIC | Age: 26
End: 2021-09-22
Payer: COMMERCIAL

## 2021-09-22 DIAGNOSIS — J32.9 OTHER SINUSITIS, UNSPECIFIED CHRONICITY: Primary | ICD-10-CM

## 2021-09-22 PROCEDURE — 99213 OFFICE O/P EST LOW 20 MIN: CPT | Mod: TEL | Performed by: EMERGENCY MEDICINE

## 2021-09-22 NOTE — PROGRESS NOTES
"Video visit:    Start time: 12:46 PM  Stop time: 12:54 PM    Assessment: Persistent sinus symptoms, progressive in nature after 1 week history of URI symptoms.  Seen in urgent care at which time strep test was negative.  Covid test was performed but no results in chart.    Plan: Augmentin for sinusitis.  Will reorder Covid test with patient investigating why no results today.    CHIEF COMPLAINT: Sinus congestion      HPI: Patient is a 26-year-old male who became ill 1 week ago with a sore throat and nasal congestion with slight cough.  He was seen in urgent care which time his strep test was negative.  Covid swab was obtained but no test results entered into chart.  He now has had persistent facial pressure into his teeth and sinus congestion.      ROS: See HPI otherwise normal.    Allergies   Allergen Reactions     Amitriptyline-Perphenazine [Perphenazine-Amitriptyline] GI Disturbance     Pt developed an ulcer after taking med      Current Outpatient Medications   Medication Sig Dispense Refill     amoxicillin-clavulanate (AUGMENTIN) 875-125 MG tablet Take 1 tablet by mouth 2 times daily for 7 days 14 tablet 0     minoxidil (LONITEN) 2.5 MG tablet Take 1 tablet (2.5 mg) by mouth daily 120 tablet 2     sertraline (ZOLOFT) 100 MG tablet Take 1.5 tablets (150 mg) by mouth daily 135 tablet 3     Syringe/Needle, Disp, 23G X 1\" 1 ML MISC To use with weekly IM injection 50 each 3     testosterone cypionate (DEPOTESTOSTERONE) 200 MG/ML injection Inject 0.3 mLs (60 mg) into the muscle once a week 4 mL 2         PE: No acute distress on video visit.  Alert.  Nondyspneic appearing.        TREATMENT: None.      ASSESSMENT: Protracted sinus symptoms, will treat for sinusitis.  Patient to investigate his Covid results at the [facility].  I will order a repeat Covid PCR.      DIAGNOSIS: Sinusitis.      PLAN: Augmentin as instructed.  Repeat PCR Covid ordered.    Time: 8 minutes    "

## 2021-10-06 DIAGNOSIS — L64.9 ANDROGENETIC ALOPECIA: ICD-10-CM

## 2021-10-07 NOTE — TELEPHONE ENCOUNTER
MINOXIDIL 2.5 MG TABLET  Last Written Prescription Date:  8/17/2020  Last Fill Quantity: 120,   # refills: 2  Last Office Visit : 8/17/2020  Future Office visit:  None    Routing refill request to provider for review/approval because:  Drug not on the G, P or [facility] refill protocol or controlled substance      Bing Ward RN  Central Triage Red Flags/Med Refills

## 2021-10-08 RX ORDER — MINOXIDIL 2.5 MG/1
2.5 TABLET ORAL DAILY
Qty: 120 TABLET | Refills: 3 | Status: SHIPPED | OUTPATIENT
Start: 2021-10-08 | End: 2022-10-20

## 2021-10-24 ENCOUNTER — HEALTH MAINTENANCE LETTER (OUTPATIENT)
Age: 26
End: 2021-10-24

## 2022-02-13 ENCOUNTER — HEALTH MAINTENANCE LETTER (OUTPATIENT)
Age: 27
End: 2022-02-13

## 2022-03-13 ENCOUNTER — ANCILLARY PROCEDURE (OUTPATIENT)
Dept: GENERAL RADIOLOGY | Facility: CLINIC | Age: 27
End: 2022-03-13
Attending: FAMILY MEDICINE
Payer: COMMERCIAL

## 2022-03-13 ENCOUNTER — OFFICE VISIT (OUTPATIENT)
Dept: URGENT CARE | Facility: URGENT CARE | Age: 27
End: 2022-03-13
Payer: COMMERCIAL

## 2022-03-13 ENCOUNTER — TELEPHONE (OUTPATIENT)
Dept: URGENT CARE | Facility: URGENT CARE | Age: 27
End: 2022-03-13

## 2022-03-13 VITALS
TEMPERATURE: 98.5 F | SYSTOLIC BLOOD PRESSURE: 112 MMHG | HEART RATE: 83 BPM | OXYGEN SATURATION: 97 % | DIASTOLIC BLOOD PRESSURE: 70 MMHG | RESPIRATION RATE: 16 BRPM

## 2022-03-13 DIAGNOSIS — R05.9 COUGH: ICD-10-CM

## 2022-03-13 DIAGNOSIS — R05.9 COUGH: Primary | ICD-10-CM

## 2022-03-13 PROCEDURE — 71046 X-RAY EXAM CHEST 2 VIEWS: CPT | Performed by: RADIOLOGY

## 2022-03-13 PROCEDURE — 99213 OFFICE O/P EST LOW 20 MIN: CPT | Performed by: FAMILY MEDICINE

## 2022-03-13 RX ORDER — BENZONATATE 100 MG/1
200 CAPSULE ORAL 3 TIMES DAILY PRN
Qty: 42 CAPSULE | Refills: 3 | Status: SHIPPED | OUTPATIENT
Start: 2022-03-13

## 2022-03-13 NOTE — PROGRESS NOTES
"SUBJECTIVE:   Rohan Wilson is a 27 year old adult presenting with a chief complaint of chest congestion for the past 7 days, coughing (productive of green phlegm, mild) for the past four weeks (the cough was improving until last week when the cough started to worsen), wheezing (now gone).  There has been postnasal drainage (worsening over the past week).  No recent heart burn.  No history of asthma.  He denies wheezing/chest tightness.      No shortness of breath  No recent fevers.     Onset of symptoms was four weeks ago.  Course of illness is worsening..      Treatment measures tried include Mucinex (about one week after being diagnosed with influenza).    No close contacts with COVID-19.      Patient received 2 Moderna vaccine doses against COVID-19 (April 8, 2021, and May 11, 2021).   His last pertussis vaccine was received in 2007.    .  He was diagnosed with Influenza 4-5 weeks ago.      Past Medical History:   Diagnosis Date     Congenital dislocation of hip, unilateral birth    Congenital hip dislocation     Varicella without mention of complication     varicella (6 lesions-age 6 weeks)     Volume depletion     dehydration with C. Diff-1998, dehydration with Rotovirus-2000     Current Outpatient Medications   Medication Sig Dispense Refill     minoxidil (LONITEN) 2.5 MG tablet Take 1 tablet (2.5 mg) by mouth daily 120 tablet 3     sertraline (ZOLOFT) 100 MG tablet Take 1.5 tablets (150 mg) by mouth daily 135 tablet 3     Syringe/Needle, Disp, 23G X 1\" 1 ML MISC To use with weekly IM injection 50 each 3     testosterone cypionate (DEPOTESTOSTERONE) 200 MG/ML injection Inject 0.3 mLs (60 mg) into the muscle once a week 4 mL 2     Social History     Tobacco Use     Smoking status: Never Smoker     Smokeless tobacco: Never Used     Tobacco comment: no smokers in the home   Substance Use Topics     Alcohol use: No     Alcohol/week: 0.0 standard drinks       ROS:  CONSTITUTIONAL:NEGATIVE  for fevers. "   ENT/MOUTH: positive for postnasal drainage.   RESP: positive for coughing.    CV:  No chest pain.     OBJECTIVE:  /70   Pulse 83   Temp 98.5  F (36.9  C) (Tympanic)   Resp 16   SpO2 97%   GENERAL APPEARANCE: healthy, alert and no distress.  No acute respiratory distress.    HENT: nasal turbinates erythematous, swollen and oral mucous membranes moist, no erythema noted  NECK: supple, nontender, no lymphadenopathy  RESP: lungs clear to auscultation - no rales, rhonchi or wheezes  CV: regular rates and rhythm, normal S1 S2, no murmur noted  SKIN: No cyanosis/pallor    chest x-ray:  I viewed all X-ray images during this clinic encounter.  The chest x-ray showed no infiltrates/effusions/masses/cardiomegaly and no pneumothorax.      ASSESSMENT:  Cough, most likely secondary to bronchitis. chest x-ray showed no evidence of pneumonia/effusions/pneumothorax/masses.      PLAN:    Rx:  Tessalon Perles    Patient declined the COVID-19 PCR Test today.      Take Mucinex to thin the thick secretions that you're coughing up.      Inhale steam or use a room humidifier.      follow up with your primary care provider if not better in 10-14 days.      Start Flonase nasal spray.  Place two sprays in each nostril once daily until the postnasal drainage improves.      Ming Turcios MD

## 2022-03-13 NOTE — PATIENT INSTRUCTIONS
Take Mucinex to thin the thick secretions that you're coughing up.        Inhale steam or use a room humidifier.      follow up with your primary care provider if not better in 10-14 days.      Start Flonase nasal spray.  Place two sprays in each nostril once daily until the postnasal drainage improves.

## 2022-03-14 NOTE — TELEPHONE ENCOUNTER
"SUBJECTIVE:  The radiology report on the March 13, 2022, chest x-ray showed questionable \"mild central airway thickening which may reflect bronchitis. Lungs are clear. No consolidation or focal pneumonia...\"    PLAN:  Please notify patient of this radiology report result.  Patient's cough is most likely from a viral bronchitis (a viral infection of the large airways of the lungs.  Continue the current treatment plan as recommended by Dr. Turcios today.  follow up with the primary care provider if not better in 10-14 days.     Ming Turcios MD    "

## 2022-10-14 DIAGNOSIS — F32.5 MAJOR DEPRESSIVE DISORDER WITH SINGLE EPISODE, IN FULL REMISSION (H): ICD-10-CM

## 2022-10-14 NOTE — LETTER
[facility]  [address], [address]  [place], [place]  [address]                                            TEL:[phone]  FAX:[phone]      Rohan Wilson  [address] [place] [address]      October 19, 2022    Dear Rohan       We have received a refill request from your pharmacy for your medication - sertraline. Many medications require routine follow-up with your provider and a review of your chart indicates that you are due for a visit. We have sent a one time, 90 day refill to your pharmacy to allow you time to schedule an appointment.     Please call [phone] to schedule an appointment.  We look forward to seeing you in the near future.      Thank you,     [facility]

## 2022-10-15 ENCOUNTER — HEALTH MAINTENANCE LETTER (OUTPATIENT)
Age: 27
End: 2022-10-15

## 2022-10-16 DIAGNOSIS — L64.9 ANDROGENETIC ALOPECIA: ICD-10-CM

## 2022-10-17 NOTE — TELEPHONE ENCOUNTER
Sertraline (Zoloft) 100 mg tab  Routing refill request to provider for review/approval because:  Patient needs to be seen because it has been more than 1 year since last office visit.  PHQ-9 out of date for [facility] protocol range for RN to refill.    PHQ 9/15/2018 3/12/2020 9/13/2021   PHQ-9 Total Score 1 4 4   Q9: Thoughts of better off dead/self-harm past 2 weeks Not at all Not at all Not at all   Some encounter information is confidential and restricted. Go to Review Flowsheets activity to see all data.     LOV 09/13/2021

## 2022-10-18 RX ORDER — SERTRALINE HYDROCHLORIDE 100 MG/1
150 TABLET, FILM COATED ORAL DAILY
Qty: 135 TABLET | Refills: 0 | Status: SHIPPED | OUTPATIENT
Start: 2022-10-18 | End: 2023-01-19

## 2022-10-20 NOTE — TELEPHONE ENCOUNTER
minoxidil (LONITEN) 2.5 MG tablet      Last Written Prescription Date:  10-8-21  Last Fill Quantity: 120,   # refills: 3  Last Office Visit : 8-17-20 ( RTC 1 y)  Future Office visit:  none    Routing refill request to provider for review/approval because:  Med not on Derm  protocol  Scheduling has been notified to contact the pt for appointment.

## 2022-10-24 RX ORDER — MINOXIDIL 2.5 MG/1
2.5 TABLET ORAL DAILY
Qty: 30 TABLET | Refills: 1 | Status: SHIPPED | OUTPATIENT
Start: 2022-10-24 | End: 2023-01-05

## 2023-01-04 DIAGNOSIS — L64.9 ANDROGENETIC ALOPECIA: ICD-10-CM

## 2023-01-05 NOTE — TELEPHONE ENCOUNTER
MINOXIDIL 2.5MG TABLETS  Last Written Prescription Date:   10/24/2022  Last Fill Quantity: 30,   # refills: 1  Last Office Visit :  8/17/2020  Future Office visit:   2/16/2023    Routing refill request to provider for review/approval because:  Pt has visit on 2/16/2023.  Med not on protocol  Refer to Provider for review       Bing Ward RN  Central Triage Red Flags/Med Refills

## 2023-01-12 RX ORDER — MINOXIDIL 2.5 MG/1
2.5 TABLET ORAL DAILY
Qty: 90 TABLET | Refills: 3 | Status: SHIPPED | OUTPATIENT
Start: 2023-01-12 | End: 2023-02-16

## 2023-01-15 DIAGNOSIS — F32.5 MAJOR DEPRESSIVE DISORDER WITH SINGLE EPISODE, IN FULL REMISSION (H): ICD-10-CM

## 2023-01-16 NOTE — TELEPHONE ENCOUNTER
Routing refill request to provider for review/approval because:  Patient needs to be seen because it has been more than 1 year since last office visit.    Last appointment was a virtual on 9/13/21.

## 2023-01-17 ENCOUNTER — TELEPHONE (OUTPATIENT)
Dept: NURSING | Facility: CLINIC | Age: 28
End: 2023-01-17
Payer: COMMERCIAL

## 2023-01-17 RX ORDER — SERTRALINE HYDROCHLORIDE 100 MG/1
TABLET, FILM COATED ORAL
Qty: 135 TABLET | Refills: 0 | OUTPATIENT
Start: 2023-01-17

## 2023-01-17 NOTE — TELEPHONE ENCOUNTER
Opened in error, see charting in refill encounter on 1/15/23.    Tarsha Baig RN  [facility]

## 2023-01-17 NOTE — TELEPHONE ENCOUNTER
Did not schedule despite phone calls and letters.  Denied medication.  If he schedules I can order 1 refill.

## 2023-01-17 NOTE — TELEPHONE ENCOUNTER
Patient calls regarding refill request. He is given message that appointment is needed. Patient is scheduled for a video visit on 2/21/23. Message will be sent to provider to see if 1 refill can be sent per message.    Tarsha Baig RN  [facility]

## 2023-01-19 RX ORDER — SERTRALINE HYDROCHLORIDE 100 MG/1
150 TABLET, FILM COATED ORAL DAILY
Qty: 135 TABLET | Refills: 0 | Status: SHIPPED | OUTPATIENT
Start: 2023-01-19 | End: 2023-04-11

## 2023-02-16 ENCOUNTER — OFFICE VISIT (OUTPATIENT)
Dept: DERMATOLOGY | Facility: CLINIC | Age: 28
End: 2023-02-16
Payer: COMMERCIAL

## 2023-02-16 VITALS — SYSTOLIC BLOOD PRESSURE: 116 MMHG | DIASTOLIC BLOOD PRESSURE: 69 MMHG

## 2023-02-16 DIAGNOSIS — L64.9 ANDROGENETIC ALOPECIA: ICD-10-CM

## 2023-02-16 PROCEDURE — 99214 OFFICE O/P EST MOD 30 MIN: CPT | Performed by: DERMATOLOGY

## 2023-02-16 RX ORDER — MINOXIDIL 2.5 MG/1
5 TABLET ORAL DAILY
Qty: 180 TABLET | Refills: 3 | Status: SHIPPED | OUTPATIENT
Start: 2023-02-16

## 2023-02-16 ASSESSMENT — PAIN SCALES - GENERAL: PAINLEVEL: NO PAIN (0)

## 2023-02-16 NOTE — LETTER
2/16/2023       RE: Rohan Wilson  [address] [facility] [address]     Dear Colleague,    Thank you for referring your patient, Rohan Wilson, to the [facility] [place] at [facility]. Please see a copy of my visit note below.    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 Chronic, flare/not yet at goal.  - Okay to stop Rogaine if patient desires   - Increase to oral minoxidil 5 mg daily. Patient denies leg swelling or facial hair growth that is bothersome. Okay to decrease back to 2.5 mg if patient has side effects.  - Future: Consider laser light comb vs PRP injections as an alternative/adjunctive option   - Photodocumentation today    Procedures Performed:   None.    Follow-up: 1 year(s) in-person, or earlier for new or changing lesions    Staff and Scribe:     Scribe Disclosure:  I, MARITZA WALLIS, am serving as a scribe to document services personally performed by Bi Jones MD based on data collection and the provider's statements to me.     Provider Disclosure:   The documentation recorded by the scribe accurately reflects the services I personally performed and the decisions made by me.    Bi Jones MD    Department of Dermatology  [facility]: Phone: [phone], Fax:[phone]  [facility]:  "Phone: [phone] Fax: [phone]  ____________________________________________    CC: Hair Loss (Pt here for a med check regarding hair loss)    HPI:  Mr. Rohan Wilson is a(n) 27 year old adult who presents today as a return patient for hair loss. Last seen by Zulay Canas PA-C on 8/17/20, at which time no changes were made to patient care.    Today, the patient reports that he has been on oral minoxidil for several years. He believes it is working. Denies any swelling in the legs or excess unwanted hair. He also uses topical minoxidil. Denies any scalp itching.     Patient is otherwise feeling well, without additional skin concerns.    Labs Reviewed:  N/A    Physical Exam:  Vitals: /69 (BP Location: Right arm, Patient Position: Right side, Cuff Size: Adult Regular)   SKIN: Focused examination of the scalp was performed.  - Mild decreased hair density over the vertex and frontal temporal scalp in a male pattern distribution.   - No other lesions of concern on areas examined.     Medications:  Current Outpatient Medications   Medication     minoxidil (LONITEN) 2.5 MG tablet     sertraline (ZOLOFT) 100 MG tablet     Syringe/Needle, Disp, 23G X 1\" 1 ML MISC     testosterone cypionate (DEPOTESTOSTERONE) 200 MG/ML injection     benzonatate (TESSALON) 100 MG capsule     No current facility-administered medications for this visit.      Past Medical History:   Patient Active Problem List   Diagnosis     Major depressive disorder, recurrent episode, in partial remission (H)     Female-to-male transgender person     CARDIOVASCULAR SCREENING; LDL GOAL LESS THAN 130     Migraine without status migrainosus, not intractable, unspecified migraine type     Past Medical History:   Diagnosis Date     Congenital dislocation of hip, unilateral birth    Congenital hip dislocation     Varicella without mention of complication     varicella (6 lesions-age 6 weeks)     Volume depletion     dehydration with C. Diff-1998, " dehydration with Rotovirus-2000

## 2023-02-16 NOTE — NURSING NOTE
Chief Complaint   Patient presents with     Hair Loss     Pt here for a med check regarding hair loss     Daniel Mahan, EMT

## 2023-02-16 NOTE — PROGRESS NOTES
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 Chronic, flare/not yet at goal.  - Okay to stop Rogaine if patient desires   - Increase to oral minoxidil 5 mg daily. Patient denies leg swelling or facial hair growth that is bothersome. Okay to decrease back to 2.5 mg if patient has side effects.  - Future: Consider laser light comb vs PRP injections as an alternative/adjunctive option   - Photodocumentation today    Procedures Performed:   None.    Follow-up: 1 year(s) in-person, or earlier for new or changing lesions    Staff and Scribe:     Scribe Disclosure:  I, MARITZA WALLIS, am serving as a scribe to document services personally performed by Bi Jones MD based on data collection and the provider's statements to me.     Provider Disclosure:   The documentation recorded by the scribe accurately reflects the services I personally performed and the decisions made by me.    Bi Jones MD    Department of Dermatology  [facility]: Phone: [phone], Fax:[phone]  [facility]: Phone: [phone] Fax: [phone]  ____________________________________________    CC: Hair Loss (Pt here for a med check regarding hair loss)    HPI:  Mr. Rohan Wlison is a(n) 27 year old adult who presents today as a return patient for hair loss. Last seen by Zulay Canas PA-C on 8/17/20, at which time no changes were  "made to patient care.    Today, the patient reports that he has been on oral minoxidil for several years. He believes it is working. Denies any swelling in the legs or excess unwanted hair. He also uses topical minoxidil. Denies any scalp itching.     Patient is otherwise feeling well, without additional skin concerns.    Labs Reviewed:  N/A    Physical Exam:  Vitals: /69 (BP Location: Right arm, Patient Position: Right side, Cuff Size: Adult Regular)   SKIN: Focused examination of the scalp was performed.  - Mild decreased hair density over the vertex and frontal temporal scalp in a male pattern distribution.   - No other lesions of concern on areas examined.     Medications:  Current Outpatient Medications   Medication     minoxidil (LONITEN) 2.5 MG tablet     sertraline (ZOLOFT) 100 MG tablet     Syringe/Needle, Disp, 23G X 1\" 1 ML MISC     testosterone cypionate (DEPOTESTOSTERONE) 200 MG/ML injection     benzonatate (TESSALON) 100 MG capsule     No current facility-administered medications for this visit.      Past Medical History:   Patient Active Problem List   Diagnosis     Major depressive disorder, recurrent episode, in partial remission (H)     Female-to-male transgender person     CARDIOVASCULAR SCREENING; LDL GOAL LESS THAN 130     Migraine without status migrainosus, not intractable, unspecified migraine type     Past Medical History:   Diagnosis Date     Congenital dislocation of hip, unilateral birth    Congenital hip dislocation     Varicella without mention of complication     varicella (6 lesions-age 6 weeks)     Volume depletion     dehydration with C. Diff-1998, dehydration with Rotovirus-2000       "

## 2023-04-11 ENCOUNTER — MYC REFILL (OUTPATIENT)
Dept: INTERNAL MEDICINE | Facility: CLINIC | Age: 28
End: 2023-04-11
Payer: COMMERCIAL

## 2023-04-11 DIAGNOSIS — F32.5 MAJOR DEPRESSIVE DISORDER WITH SINGLE EPISODE, IN FULL REMISSION (H): ICD-10-CM

## 2023-04-14 RX ORDER — SERTRALINE HYDROCHLORIDE 100 MG/1
150 TABLET, FILM COATED ORAL DAILY
Qty: 135 TABLET | Refills: 0 | Status: SHIPPED | OUTPATIENT
Start: 2023-04-14 | End: 2023-07-12

## 2023-04-17 DIAGNOSIS — F32.5 MAJOR DEPRESSIVE DISORDER WITH SINGLE EPISODE, IN FULL REMISSION (H): ICD-10-CM

## 2023-04-19 RX ORDER — SERTRALINE HYDROCHLORIDE 100 MG/1
TABLET, FILM COATED ORAL
Qty: 135 TABLET | Refills: 0 | OUTPATIENT
Start: 2023-04-19

## 2023-04-19 NOTE — TELEPHONE ENCOUNTER
Refilled on 4/14/23 and sent to [facility] in [facility].   Received refill request again from [facility] in [place] on 4/17/23.    [facility] they informed writer patient picked up medication on 4/17/23.   Will refuse refill request.

## 2023-06-01 ENCOUNTER — HEALTH MAINTENANCE LETTER (OUTPATIENT)
Age: 28
End: 2023-06-01

## 2023-07-11 DIAGNOSIS — F32.5 MAJOR DEPRESSIVE DISORDER WITH SINGLE EPISODE, IN FULL REMISSION (H): ICD-10-CM

## 2023-07-11 NOTE — TELEPHONE ENCOUNTER
Routing refill request to provider for review/approval because:      9/15/2018     8:54 AM 3/12/2020    10:04 AM 9/13/2021     4:54 PM   PHQ   PHQ-9 Total Score 1 4 4   Q9: Thoughts of better off dead/self-harm past 2 weeks Not at all Not at all Not at all

## 2023-07-12 RX ORDER — SERTRALINE HYDROCHLORIDE 100 MG/1
TABLET, FILM COATED ORAL
Qty: 135 TABLET | Refills: 0 | Status: SHIPPED | OUTPATIENT
Start: 2023-07-12 | End: 2023-08-04

## 2023-08-04 ENCOUNTER — VIRTUAL VISIT (OUTPATIENT)
Dept: INTERNAL MEDICINE | Facility: CLINIC | Age: 28
End: 2023-08-04
Payer: COMMERCIAL

## 2023-08-04 DIAGNOSIS — F32.5 MAJOR DEPRESSIVE DISORDER WITH SINGLE EPISODE, IN FULL REMISSION (H): ICD-10-CM

## 2023-08-04 PROCEDURE — 96127 BRIEF EMOTIONAL/BEHAV ASSMT: CPT

## 2023-08-04 PROCEDURE — 99213 OFFICE O/P EST LOW 20 MIN: CPT | Mod: 95

## 2023-08-04 RX ORDER — SERTRALINE HYDROCHLORIDE 100 MG/1
150 TABLET, FILM COATED ORAL DAILY
Qty: 135 TABLET | Refills: 3 | Status: SHIPPED | OUTPATIENT
Start: 2023-08-04

## 2023-08-04 ASSESSMENT — PATIENT HEALTH QUESTIONNAIRE - PHQ9
10. IF YOU CHECKED OFF ANY PROBLEMS, HOW DIFFICULT HAVE THESE PROBLEMS MADE IT FOR YOU TO DO YOUR WORK, TAKE CARE OF THINGS AT HOME, OR GET ALONG WITH OTHER PEOPLE: SOMEWHAT DIFFICULT
SUM OF ALL RESPONSES TO PHQ QUESTIONS 1-9: 3
SUM OF ALL RESPONSES TO PHQ QUESTIONS 1-9: 3

## 2023-08-04 NOTE — PROGRESS NOTES
Patient looking for medication refill. Needs refill for zoloft.    His mood has stable.    Initially prescribed for depression and anxiety. Has continued taking it for years. Mood has been stable ongoing    Rohan is a 28 year old who is being evaluated via a billable video visit.      How would you like to obtain your AVS? MyChart  If the video visit is dropped, the invitation should be resent by: Text to cell phone: [phone]  Will anyone else be joining your video visit? No          Assessment & Plan     Major depressive disorder with single episode, in full remission (H)  Patient feels well on medication and has been taking for several years. Would like to continue med at current dose  - sertraline (ZOLOFT) 100 MG tablet; Take 1.5 tablets (150 mg) by mouth daily    Antwan Hsu NP  [facility]   Rohan is a 28 year old, presenting for the following health issues:  Recheck Medication (Follow up.)      8/4/2023    11:32 AM   Additional Questions   Roomed by Karma Gomez MA   Accompanied by Himself       History of Present Illness       Reason for visit:  Refill    He eats 2-3 servings of fruits and vegetables daily.He consumes 0 sweetened beverage(s) daily.He exercises with enough effort to increase his heart rate 30 to 60 minutes per day.  He exercises with enough effort to increase his heart rate 6 days per week.   He is taking medications regularly.      Social History     Tobacco Use    Smoking status: Never    Smokeless tobacco: Never    Tobacco comments:     no smokers in the home   Vaping Use    Vaping Use: Never used   Substance Use Topics    Alcohol use: No     Alcohol/week: 0.0 standard drinks of alcohol    Drug use: No         3/12/2020    10:04 AM 9/13/2021     4:54 PM 8/4/2023    11:13 AM   PHQ   PHQ-9 Total Score 4 4 3   Q9: Thoughts of better off dead/self-harm past 2 weeks Not at all Not at all Not at all         7/8/2016     8:21 AM 11/3/2016    11:11  AM 9/13/2021     4:54 PM   TENZIN-7 SCORE   Total Score 0 1 3         8/4/2023    11:13 AM   Last PHQ-9   1.  Little interest or pleasure in doing things 0   2.  Feeling down, depressed, or hopeless 1   3.  Trouble falling or staying asleep, or sleeping too much 1   4.  Feeling tired or having little energy 1   5.  Poor appetite or overeating 0   6.  Feeling bad about yourself 0   7.  Trouble concentrating 0   8.  Moving slowly or restless 0   Q9: Thoughts of better off dead/self-harm past 2 weeks 0   PHQ-9 Total Score 3         9/13/2021     4:54 PM   TENZIN-7    1. Feeling nervous, anxious, or on edge 0   2. Not being able to stop or control worrying 0   3. Worrying too much about different things 0   4. Trouble relaxing 1   5. Being so restless that it is hard to sit still 1   6. Becoming easily annoyed or irritable 1   7. Feeling afraid, as if something awful might happen 0   TENZIN-7 Total Score 3   If you checked any problems, how difficult have they made it for you to do your work, take care of things at home, or get along with other people? Not difficult at all     Review of Systems   Constitutional, HEENT, cardiovascular, pulmonary, gi and gu systems are negative, except as otherwise noted.      Objective           Vitals:  No vitals were obtained today due to virtual visit.    Physical Exam   GENERAL: Healthy, alert and no distress  EYES: Eyes grossly normal to inspection.  No discharge or erythema, or obvious scleral/conjunctival abnormalities.  RESP: No audible wheeze, cough, or visible cyanosis.  No visible retractions or increased work of breathing.    SKIN: Visible skin clear. No significant rash, abnormal pigmentation or lesions.  NEURO: Cranial nerves grossly intact.  Mentation and speech appropriate for age.  PSYCH: Mentation appears normal, affect normal/bright, judgement and insight intact, normal speech and appearance well-groomed.          Video-Visit Details    Type of service:  Video Visit      Originating Location (pt. Location): Home  Distant Location (provider location):  On-site  Platform used for Video Visit: Heather

## 2024-04-13 DIAGNOSIS — L64.9 ANDROGENETIC ALOPECIA: ICD-10-CM

## 2024-04-13 RX ORDER — MINOXIDIL 2.5 MG/1
5 TABLET ORAL DAILY
Qty: 180 TABLET | Refills: 3 | Status: CANCELLED | OUTPATIENT
Start: 2024-04-13

## 2024-04-17 NOTE — TELEPHONE ENCOUNTER
minoxidil (LONITEN) 2.5 MG tablet       Last Written Prescription Date:  2/16/23  Last Fill Quantity: 180,   # refills: 3  Last Office Visit : 2/16/23  Future Office visit:  One year    Routing refill request to provider for review/approval because:  Drug not on the [facility], [facility] or [facility] refill protocol    Former patient of Dr Jones

## 2024-05-26 ENCOUNTER — HEALTH MAINTENANCE LETTER (OUTPATIENT)
Age: 29
End: 2024-05-26

## 2024-06-13 ENCOUNTER — OFFICE VISIT (OUTPATIENT)
Dept: URGENT CARE | Facility: URGENT CARE | Age: 29
End: 2024-06-13
Payer: COMMERCIAL

## 2024-06-13 VITALS
RESPIRATION RATE: 16 BRPM | DIASTOLIC BLOOD PRESSURE: 72 MMHG | HEART RATE: 61 BPM | WEIGHT: 170 LBS | TEMPERATURE: 97.9 F | BODY MASS INDEX: 25.1 KG/M2 | OXYGEN SATURATION: 98 % | SYSTOLIC BLOOD PRESSURE: 110 MMHG

## 2024-06-13 DIAGNOSIS — J32.9 BACTERIAL SINUSITIS: Primary | ICD-10-CM

## 2024-06-13 DIAGNOSIS — R05.9 COUGH, UNSPECIFIED TYPE: ICD-10-CM

## 2024-06-13 DIAGNOSIS — J34.89 SINUS PRESSURE: ICD-10-CM

## 2024-06-13 DIAGNOSIS — B96.89 BACTERIAL SINUSITIS: Primary | ICD-10-CM

## 2024-06-13 PROCEDURE — 99213 OFFICE O/P EST LOW 20 MIN: CPT | Performed by: PHYSICIAN ASSISTANT

## 2024-06-13 RX ORDER — BENZONATATE 200 MG/1
200 CAPSULE ORAL 3 TIMES DAILY PRN
Qty: 21 CAPSULE | Refills: 0 | Status: SHIPPED | OUTPATIENT
Start: 2024-06-13 | End: 2024-06-20

## 2024-06-13 RX ORDER — CETIRIZINE HYDROCHLORIDE, PSEUDOEPHEDRINE HYDROCHLORIDE 5; 120 MG/1; MG/1
1 TABLET, FILM COATED, EXTENDED RELEASE ORAL 2 TIMES DAILY
Qty: 20 TABLET | Refills: 0 | Status: SHIPPED | OUTPATIENT
Start: 2024-06-13

## 2024-06-13 RX ORDER — CETIRIZINE HYDROCHLORIDE, PSEUDOEPHEDRINE HYDROCHLORIDE 5; 120 MG/1; MG/1
1 TABLET, FILM COATED, EXTENDED RELEASE ORAL 2 TIMES DAILY
Qty: 20 TABLET | Refills: 0 | Status: SHIPPED | OUTPATIENT
Start: 2024-06-13 | End: 2024-06-13

## 2024-06-13 NOTE — PROGRESS NOTES
Assessment & Plan     Bacterial sinusitis    You have been diagnosed with a bacterial sinus infection. Sinusitis can occur during a cold. It can also happen due to allergies to pollens and other particles in the air.  A sinus infection can sometimes cause fever, headache, and facial pain. There is often green or yellow fluid draining from the nose or into the back of the throat (post-nasal drip). This infection is treated with antibiotics.  Home care  Take the full course of antibiotics as instructed. Don't stop taking them, even when you feel better.  Drink plenty of water, hot tea, and other liquids as directed by the healthcare provider. This may help thin nasal mucus. It also may help your sinuses drain fluids.  Heat may help soothe painful areas of your face. Use a towel soaked in hot water. Or,  the shower and direct the warm spray onto your face. Using a vaporizer along with a menthol rub at night may also help soothe symptoms.     - amoxicillin-clavulanate (AUGMENTIN) 875-125 MG tablet; Take 1 tablet by mouth 2 times daily for 7 days    Sinus pressure    Hot showers  Stream  Zyrtec D for congestion  - cetirizine-pseudoePHEDrine ER (ZYRTEC-D) 5-120 MG 12 hr tablet; Take 1 tablet by mouth 2 times daily    Cough, unspecified type    Tessalon for coughing  - benzonatate (TESSALON) 200 MG capsule; Take 1 capsule (200 mg) by mouth 3 times daily as needed      At today's visit with Rohan Wilson , we discussed results, diagnosis, medications and formulated a plan.  We also discussed red flags for immediate return to clinic/ER, as well as indications for follow up with PCP if not improved in 3 days. Patient understood and agreed to plan. Rohan Wilson was discharged with stable vitals and has no further questions.       No follow-ups on file.    Irving Madrigal is a 29 year old, presenting for the following health issues:  Urgent Care (Started on Saturday with a head cold, sore throat, and now  the sore throat, head cold is gone, but develop congestion and sinus )    HPI     Review of Systems  Constitutional, neuro, ENT, endocrine, pulmonary, cardiac, gastrointestinal, genitourinary, musculoskeletal, integument and psychiatric systems are negative, except as otherwise noted.      Objective    /72   Pulse 61   Temp 97.9  F (36.6  C)   Resp 16   Wt 77.1 kg (170 lb)   SpO2 98%   BMI 25.10 kg/m    Body mass index is 25.1 kg/m .  Physical Exam   GENERAL: alert and no distress  EYES: Eyes grossly normal to inspection, PERRL and conjunctivae and sclerae normal  HENT: normal cephalic/atraumatic, ear canals and TM's normal, nasal mucosa edematous , and sinuses: maxillary, frontal tenderness on both sides  NECK: no adenopathy, no asymmetry, masses, or scars  RESP: lungs clear to auscultation - no rales, rhonchi or wheezes  CV: regular rate and rhythm, normal S1 S2, no S3 or S4, no murmur, click or rub, no peripheral edema  SKIN: no suspicious lesions or rashes  NEURO: Normal strength and tone, mentation intact and speech normal  PSYCH: mentation appears normal, affect normal/bright            Signed Electronically by: Howard Ferguson, [facility], [place]

## 2024-10-07 ENCOUNTER — MYC REFILL (OUTPATIENT)
Dept: DERMATOLOGY | Facility: CLINIC | Age: 29
End: 2024-10-07
Payer: COMMERCIAL

## 2024-10-07 DIAGNOSIS — L64.9 ANDROGENETIC ALOPECIA: ICD-10-CM

## 2024-10-08 DIAGNOSIS — F32.5 MAJOR DEPRESSIVE DISORDER WITH SINGLE EPISODE, IN FULL REMISSION (H): ICD-10-CM

## 2024-10-08 RX ORDER — MINOXIDIL 2.5 MG/1
5 TABLET ORAL DAILY
Qty: 180 TABLET | Refills: 0 | Status: SHIPPED | OUTPATIENT
Start: 2024-10-08

## 2024-10-08 NOTE — TELEPHONE ENCOUNTER
Routing refill request to provider for review/approval because:  Pt has appt scheduled with Sonja Fortune in February for hairloss.  Previously seen by Dr. Robert Mohr for refill until appt?    Luba RINCON RN  [facility] [place]  [phone]

## 2024-10-10 RX ORDER — SERTRALINE HYDROCHLORIDE 100 MG/1
150 TABLET, FILM COATED ORAL DAILY
Qty: 45 TABLET | Refills: 0 | Status: SHIPPED | OUTPATIENT
Start: 2024-10-10

## 2024-10-18 ENCOUNTER — PATIENT OUTREACH (OUTPATIENT)
Dept: CARE COORDINATION | Facility: CLINIC | Age: 29
End: 2024-10-18
Payer: COMMERCIAL

## 2024-11-05 PROBLEM — F41.9 ANXIETY: Status: ACTIVE | Noted: 2019-05-03

## 2024-11-05 PROBLEM — E29.1 HYPOGONADISM MALE: Status: RESOLVED | Noted: 2023-04-21 | Resolved: 2024-11-05

## 2024-11-07 ENCOUNTER — TELEPHONE (OUTPATIENT)
Dept: INTERNAL MEDICINE | Facility: CLINIC | Age: 29
End: 2024-11-07
Payer: COMMERCIAL

## 2024-11-07 DIAGNOSIS — F32.5 MAJOR DEPRESSIVE DISORDER WITH SINGLE EPISODE, IN FULL REMISSION (H): ICD-10-CM

## 2024-11-18 ENCOUNTER — MYC REFILL (OUTPATIENT)
Dept: INTERNAL MEDICINE | Facility: CLINIC | Age: 29
End: 2024-11-18
Payer: COMMERCIAL

## 2024-11-18 DIAGNOSIS — F32.5 MAJOR DEPRESSIVE DISORDER WITH SINGLE EPISODE, IN FULL REMISSION (H): ICD-10-CM

## 2024-11-20 NOTE — TELEPHONE ENCOUNTER
Pt sent [employer] message. He has appt on 12/10 at [facility].     Provider approval needed.     Patient Comment: Hi, i have an appointment for this medication to get renwals but need enough go get to the appointment. Can you please refill enough to get through to my appointment?

## 2024-11-21 RX ORDER — SERTRALINE HYDROCHLORIDE 100 MG/1
150 TABLET, FILM COATED ORAL DAILY
Qty: 45 TABLET | Refills: 0 | Status: SHIPPED | OUTPATIENT
Start: 2024-11-21

## 2024-11-21 RX ORDER — SERTRALINE HYDROCHLORIDE 100 MG/1
150 TABLET, FILM COATED ORAL DAILY
Qty: 45 TABLET | Refills: 0 | OUTPATIENT
Start: 2024-11-21

## 2024-11-21 NOTE — TELEPHONE ENCOUNTER
Sent for 30 days. Ensure he keeps his upcoming appt as it looks like he has canceled quite a few recent visits with IM. If he continues to cancel his appointments at other clinics, He will not be able to get any further refills from our clinic until he is seen here

## 2024-12-10 ENCOUNTER — OFFICE VISIT (OUTPATIENT)
Dept: INTERNAL MEDICINE | Facility: CLINIC | Age: 29
End: 2024-12-10
Payer: COMMERCIAL

## 2024-12-10 VITALS
WEIGHT: 181 LBS | HEIGHT: 69 IN | OXYGEN SATURATION: 97 % | TEMPERATURE: 98 F | RESPIRATION RATE: 16 BRPM | SYSTOLIC BLOOD PRESSURE: 120 MMHG | DIASTOLIC BLOOD PRESSURE: 69 MMHG | BODY MASS INDEX: 26.81 KG/M2 | HEART RATE: 66 BPM

## 2024-12-10 DIAGNOSIS — Z79.890 LONG-TERM CURRENT USE OF TESTOSTERONE REPLACEMENT THERAPY: ICD-10-CM

## 2024-12-10 DIAGNOSIS — F32.5 MAJOR DEPRESSIVE DISORDER WITH SINGLE EPISODE, IN FULL REMISSION (H): Primary | ICD-10-CM

## 2024-12-10 PROCEDURE — 99213 OFFICE O/P EST LOW 20 MIN: CPT | Performed by: INTERNAL MEDICINE

## 2024-12-10 PROCEDURE — G2211 COMPLEX E/M VISIT ADD ON: HCPCS | Performed by: INTERNAL MEDICINE

## 2024-12-10 RX ORDER — SERTRALINE HYDROCHLORIDE 100 MG/1
150 TABLET, FILM COATED ORAL DAILY
Qty: 135 TABLET | Refills: 1 | Status: SHIPPED | OUTPATIENT
Start: 2024-12-10 | End: 2024-12-10

## 2024-12-10 RX ORDER — SERTRALINE HYDROCHLORIDE 100 MG/1
150 TABLET, FILM COATED ORAL DAILY
Qty: 135 TABLET | Refills: 3 | Status: SHIPPED | OUTPATIENT
Start: 2024-12-10

## 2024-12-10 ASSESSMENT — PATIENT HEALTH QUESTIONNAIRE - PHQ9: SUM OF ALL RESPONSES TO PHQ QUESTIONS 1-9: 2

## 2024-12-10 NOTE — PATIENT INSTRUCTIONS
Continue all medications at the same doses.  Contact your usual pharmacy if you need refills.      Contact via an e-visit in the spring or summer if you wish to taper off the Sertraline if you feel that your depression is in a good place.   We can design a plan to taper you off the medications.      I recommend taking Vitamin D3 2000 units per day , which you can get at any pharmacy and most grocery stores (the cheapest prices are at [employer] and [employer]).  While the complete clinical significance of Vitamin D levels still remains to be determined, there is enough data to recommend supplementation whenever lower values are seen as it has been shown to help bone health, immune system function, and treat seasonal affective disorder.         Preventive Health Recommendations  Ages 26 - 45    Yearly exam:             See your health care provider every year in order to:  Review health changes in your medical history or your family medical history  Review and reassess any ongoing chronic medical conditions  Discuss preventive care.    Identify and aggressively manage any vascular disease risk factors (including blood pressure, cholesterol, diabetes)  Review and renew any prescription medications if you take prescription medications.  Consider STD testing if you are at risk.  Cholesterol testing starting at age 25. If you are at risk for heart disease, have your cholesterol tested at least every 5 years.   Diabetes screening every 3 years, if you are at risk for diabetes, then diabetic screening should be done annually.  Colon cancer screening normally begins at age 45, but starting at age 35 if you have a family history of colon cancer below the age of 50.    Shots:   Get the annual influenza vaccine (flu shot) each fall.   Get a tetanus shot every 10 years.   Covid vaccines are now recommended annually.  Get the most updated Covid vaccine when it becomes available, consider getting this at the same time as the  "annual influenza vaccine.    Nutrition:  Eat at least 5 servings of fruits and vegetables daily.   Eat whole-grain bread, whole-wheat pasta and brown rice instead of white grains and rice.   Talk to your provider about Calcium and Vitamin D.        --Good Grains:  Oats, brown rice, Quinoa (these do not raise the blood sugar as much)     --Bad grains:  Anything made from wheat or white rice     (because these raise the blood sugars significantly, and the possible gluten issue from wheat for some people).      --Proteins:  Aim for \"lean proteins\" including chicken, fish, seafood, pork, turkey, and eggs (in moderation); Eat red meat only occasionally        Lifestyle  Exercise for at least 150 minutes a week (30 minutes a day, 5 days a week). This will help you control your weight and prevent disease.   Limit alcohol to one drink per day.   Always use condoms for STD prevention, until you are in a committed monogamous relationship  No smoking.   Wear sunscreen to prevent skin cancer.   See your dentist every six months for an exam and cleaning.       "

## 2024-12-10 NOTE — PROGRESS NOTES
"Preventive Care Visit  [facility]  Gumaro Babcock MD, Internal Medicine  Dec 10, 2024      Assessment & Plan     (F32.5) Major depressive disorder with single episode, in full remission (H)  (primary encounter diagnosis)  Comment: This condition is currently controlled on the current medical regimen.  Continue current therapy.   Patient has been on this medicaiton for over 10 years.   Considering possible tapering off at some point.   OK to taper off, but I would wait at least until spring/summer just due to [place] winter.   Submit e-visit when they want to taper off and we can design and prescribe a tapering plan.   OK to remain on this same dose as well, one year RX sent.   Plan: sertraline (ZOLOFT) 100 MG tablet,         DISCONTINUED: sertraline (ZOLOFT) 100 MG tablet            (Z79.890) Long-term current use of testosterone replacement therapy  Comment: Known issue that I take into account for their medical decisions, managed by specialist.    Continue current therapy as directed by their specialist(s).   Plan:              BMI  Estimated body mass index is 26.73 kg/m  as calculated from the following:    Height as of this encounter: 1.753 m (5' 9\").    Weight as of this encounter: 82.1 kg (181 lb).             Irving Madrigal is a 29 year old, presenting for the following:  Depression           History of Present Illness       Reason for visit:  Medication refill check   He is taking medications regularly.      Depression     Long history of depression, on sertraline for over 10 years with good treatment options.   He has not experienced any significant side effects of this medication.     Wondering about eventual tapering off.      How are you doing with your depression since your last visit? stable  Are you having other symptoms that might be associated with depression? No  Have you had a significant life event?  No   Are you feeling anxious or having panic attacks?   " No  Do you have any concerns with your use of alcohol or other drugs? No    Social History     Tobacco Use    Smoking status: Never    Smokeless tobacco: Never    Tobacco comments:     no smokers in the home   Vaping Use    Vaping status: Never Used   Substance Use Topics    Alcohol use: No     Alcohol/week: 0.0 standard drinks of alcohol    Drug use: No         8/4/2023    11:13 AM 11/6/2024     7:18 AM 12/10/2024     8:33 AM   PHQ   PHQ-9 Total Score 3 2  2   Q9: Thoughts of better off dead/self-harm past 2 weeks Not at all  Not at all  Not at all       Patient-reported         7/8/2016     8:21 AM 11/3/2016    11:11 AM 9/13/2021     4:54 PM   TENZIN-7 SCORE   Total Score 0 1 3         Suicide Assessment Five-step Evaluation and Treatment (SAFE-T)        Health Care Directive  Patient does not have a Health Care Directive:        No data to display                   No data to display                   No data to display                  12/9/2024   Social Factors   Worry food won't last until get money to buy more No   Food not last or not have enough money for food? No   Do you have housing? (Housing is defined as stable permanent housing and does not include staying ouside in a car, in a tent, in an abandoned building, in an overnight shelter, or couch-surfing.) Yes   Are you worried about losing your housing? No   Lack of transportation? No   Unable to get utilities (heat,electricity)? No             No data to display                              No data to display              Social History     Tobacco Use    Smoking status: Never    Smokeless tobacco: Never    Tobacco comments:     no smokers in the home   Vaping Use    Vaping status: Never Used   Substance Use Topics    Alcohol use: No     Alcohol/week: 0.0 standard drinks of alcohol    Drug use: No          2.)  Receiving gender care through [facility].  Taking testosterone replacement therapy without reported side effects.  Notes reviewed through Care  "Everywhere    Reviewed and updated as needed this visit by Provider    Allergies                 **I reviewed the information recorded in the patient's EPIC chart (including but not limited to medical history, surgical history, family history, problem list, medication list, and allergy list) and updated the information as indicated based on the patients reported information.         Review of Systems  Constitutional, HEENT, cardiovascular, pulmonary, gi and gu systems are negative, except as otherwise noted.     Objective    Exam  /69   Pulse 66   Temp 98  F (36.7  C) (Temporal)   Resp 16   Ht 1.753 m (5' 9\")   Wt 82.1 kg (181 lb)   SpO2 97%   BMI 26.73 kg/m     Estimated body mass index is 26.73 kg/m  as calculated from the following:    Height as of this encounter: 1.753 m (5' 9\").    Weight as of this encounter: 82.1 kg (181 lb).    Physical Exam  GENERAL alert and no distress  EYES:  Normal sclera,conjunctiva, EOMI  HENT: oral and posterior pharynx without lesions or erythema, facies symmetric  NECK: Neck supple. No LAD, without thyroidmegaly.  RESP: Clear to ausculation bilaterally without wheezes or crackles. Normal BS in all fields.  CV: RRR normal S1S2 without murmurs, rubs or gallops.  LYMPH: no cervical lymph adenopathy appreciated  MS: extremities- no gross deformities of the visible extremities noted,   EXT:  no lower extremity edema  PSYCH: Alert and oriented times 3; speech- coherent  SKIN:  No obvious significant skin lesions on visible portions of face       The longitudinal plan of care for the diagnosis(es)/condition(s) as documented were addressed during this visit. Due to the added complexity in care, I will continue to support Rohan in the subsequent management and with ongoing continuity of care.    Signed Electronically by: Gumaro Babcock MD    "

## 2025-07-14 ENCOUNTER — OFFICE VISIT (OUTPATIENT)
Dept: URGENT CARE | Facility: URGENT CARE | Age: 30
End: 2025-07-14
Payer: COMMERCIAL

## 2025-07-14 VITALS
HEART RATE: 76 BPM | OXYGEN SATURATION: 96 % | WEIGHT: 175 LBS | SYSTOLIC BLOOD PRESSURE: 139 MMHG | DIASTOLIC BLOOD PRESSURE: 72 MMHG | HEIGHT: 69 IN | BODY MASS INDEX: 25.92 KG/M2 | RESPIRATION RATE: 16 BRPM | TEMPERATURE: 98.4 F

## 2025-07-14 DIAGNOSIS — R05.9 COUGH, UNSPECIFIED TYPE: ICD-10-CM

## 2025-07-14 DIAGNOSIS — B96.89 BACTERIAL SINUSITIS: Primary | ICD-10-CM

## 2025-07-14 DIAGNOSIS — R51.9 SINUS HEADACHE: ICD-10-CM

## 2025-07-14 DIAGNOSIS — J32.9 BACTERIAL SINUSITIS: Primary | ICD-10-CM

## 2025-07-14 PROCEDURE — 3078F DIAST BP <80 MM HG: CPT | Performed by: PHYSICIAN ASSISTANT

## 2025-07-14 PROCEDURE — 3075F SYST BP GE 130 - 139MM HG: CPT | Performed by: PHYSICIAN ASSISTANT

## 2025-07-14 PROCEDURE — 99214 OFFICE O/P EST MOD 30 MIN: CPT | Performed by: PHYSICIAN ASSISTANT

## 2025-07-14 RX ORDER — BENZONATATE 200 MG/1
200 CAPSULE ORAL 3 TIMES DAILY PRN
Qty: 21 CAPSULE | Refills: 0 | Status: SHIPPED | OUTPATIENT
Start: 2025-07-14

## 2025-07-14 RX ORDER — DOXYCYCLINE 100 MG/1
100 CAPSULE ORAL 2 TIMES DAILY
Qty: 14 CAPSULE | Refills: 0 | Status: SHIPPED | OUTPATIENT
Start: 2025-07-14 | End: 2025-07-21

## 2025-07-14 RX ORDER — PREDNISONE 20 MG/1
20 TABLET ORAL 2 TIMES DAILY
Qty: 10 TABLET | Refills: 0 | Status: SHIPPED | OUTPATIENT
Start: 2025-07-14

## 2025-07-15 NOTE — PROGRESS NOTES
Assessment & Plan     Bacterial sinusitis    You have been diagnosed with a bacterial sinus infection. Sinusitis can occur during a cold. It can also happen due to allergies to pollens and other particles in the air.  A sinus infection can sometimes cause fever, headache, and facial pain. There is often green or yellow fluid draining from the nose or into the back of the throat (post-nasal drip). This infection is treated with antibiotics.  Home care  Take the full course of antibiotics as instructed. Don't stop taking them, even when you feel better.  Drink plenty of water, hot tea, and other liquids as directed by the healthcare provider. This may help thin nasal mucus. It also may help your sinuses drain fluids.  Heat may help soothe painful areas of your face. Use a towel soaked in hot water. Or,  the shower and direct the warm spray onto your face. Using a vaporizer along with a menthol rub at night may also help soothe symptoms.     - doxycycline monohydrate (MONODOX) 100 MG capsule  Dispense: 14 capsule; Refill: 0    Cough, unspecified type    Tessalon for coughing  - benzonatate (TESSALON) 200 MG capsule  Dispense: 21 capsule; Refill: 0    Sinus headache    Hot showers  Steam  Prednisone for sinus pain and inflammation  - predniSONE (DELTASONE) 20 MG tablet  Dispense: 10 tablet; Refill: 0         At today's visit with Rohan Wilson , we discussed results, diagnosis, medications and formulated a plan.  We also discussed red flags for immediate return to clinic/ER, as well as indications for follow up with PCP if not improved in 3 days. Patient understood and agreed to plan. Rohan Wilson was discharged with stable vitals and has no further questions.       No follow-ups on file.    Howard Ferguson, [facility], PAMARCIA  M [facility] URGENT [facility] DAVID Madrigal is a 30 year old adult who presents to clinic today for the following health issues:  Chief Complaint   Patient presents with  "   Urgent Care     \"Respiratory thing\" For the last nine days - Started sore throat/Cold symptoms  Couple days ago changed into Head/Frontal Sinus congestion, Dry cough          7/14/2025     7:09 PM   Additional Questions   Roomed by RENITA Gutiérrez   Accompanied by Self     HPI  Review of Systems  Constitutional, HEENT, cardiovascular, pulmonary, gi and gu systems are negative, except as otherwise noted.      Objective    /72   Pulse 76   Temp 98.4  F (36.9  C) (Tympanic)   Resp 16   Ht 1.753 m (5' 9\")   Wt 79.4 kg (175 lb)   SpO2 96%   BMI 25.84 kg/m    Physical Exam   GENERAL: alert and no distress  EYES: Eyes grossly normal to inspection, PERRL and conjunctivae and sclerae normal  HENT: normal cephalic/atraumatic, ear canals and TM's normal, nasal mucosa edematous , rhinorrhea yellow and thick, oropharynx clear, and oral mucous membranes moist  NECK: no adenopathy, no asymmetry, masses, or scars. Pos for sinus pain bilaterally  RESP: lungs clear to auscultation - no rales, rhonchi or wheezes  CV: regular rate and rhythm, normal S1 S2, no S3 or S4, no murmur, click or rub, no peripheral edema  MS: no gross musculoskeletal defects noted, no edema  SKIN: no suspicious lesions or rashes  NEURO: Normal strength and tone, mentation intact and speech normal  PSYCH: mentation appears normal, affect normal/bright          "

## 2025-07-15 NOTE — PROGRESS NOTES
"Urgent Care Clinic Visit    Chief Complaint   Patient presents with    Urgent Care     \"Respiratory thing\" For the last nine days - Started sore throat/Cold symptoms  Couple days ago changed into Head/Frontal Sinus congestion, Dry cough                7/14/2025     7:09 PM   Additional Questions   Roomed by RENITA Gutiérrez   Accompanied by Self             "